# Patient Record
Sex: FEMALE | Race: ASIAN | Employment: FULL TIME | URBAN - METROPOLITAN AREA
[De-identification: names, ages, dates, MRNs, and addresses within clinical notes are randomized per-mention and may not be internally consistent; named-entity substitution may affect disease eponyms.]

---

## 2017-10-24 ENCOUNTER — APPOINTMENT (OUTPATIENT)
Dept: GENERAL RADIOLOGY | Age: 37
End: 2017-10-24
Attending: STUDENT IN AN ORGANIZED HEALTH CARE EDUCATION/TRAINING PROGRAM
Payer: MEDICAID

## 2017-10-24 ENCOUNTER — HOSPITAL ENCOUNTER (EMERGENCY)
Age: 37
Discharge: HOME | End: 2017-10-24
Attending: STUDENT IN AN ORGANIZED HEALTH CARE EDUCATION/TRAINING PROGRAM
Payer: MEDICAID

## 2017-10-24 VITALS
HEIGHT: 62 IN | BODY MASS INDEX: 34.78 KG/M2 | SYSTOLIC BLOOD PRESSURE: 152 MMHG | WEIGHT: 189 LBS | DIASTOLIC BLOOD PRESSURE: 96 MMHG | TEMPERATURE: 98.3 F | RESPIRATION RATE: 16 BRPM | HEART RATE: 102 BPM | OXYGEN SATURATION: 99 %

## 2017-10-24 DIAGNOSIS — J06.9 ACUTE UPPER RESPIRATORY INFECTION: Primary | ICD-10-CM

## 2017-10-24 PROCEDURE — 71020 XR CHEST PA LAT: CPT

## 2017-10-24 PROCEDURE — 99282 EMERGENCY DEPT VISIT SF MDM: CPT

## 2017-10-24 RX ORDER — BENZONATATE 100 MG/1
100 CAPSULE ORAL
Qty: 30 CAP | Refills: 0 | Status: SHIPPED | OUTPATIENT
Start: 2017-10-24 | End: 2017-10-31

## 2017-10-24 RX ORDER — GUAIFENESIN 600 MG/1
600 TABLET, EXTENDED RELEASE ORAL 2 TIMES DAILY
Qty: 10 TAB | Refills: 0 | Status: SHIPPED | OUTPATIENT
Start: 2017-10-24 | End: 2018-04-16 | Stop reason: ALTCHOICE

## 2017-10-25 NOTE — DISCHARGE INSTRUCTIONS

## 2017-10-25 NOTE — ED PROVIDER NOTES
HPI Comments: This is a 41 yo  female with complaint of sore throat, runny nose, nasal congestion and post-tussive emesis since yesterday. Pt daughter has same. No medications PTA. No associated fever, chills, headache, SOB, wheezing, abdominal pain, vomiting, diarrhea or urinary complaint. Patient is a 40 y.o. female presenting with cough. The history is provided by the patient. Cough   Associated symptoms include rhinorrhea and sore throat. Pertinent negatives include no chest pain, no chills, no ear pain, no headaches, no shortness of breath, no nausea, no vomiting and no confusion. History reviewed. No pertinent past medical history. History reviewed. No pertinent surgical history. History reviewed. No pertinent family history. Social History     Social History    Marital status:      Spouse name: N/A    Number of children: N/A    Years of education: N/A     Occupational History    Not on file. Social History Main Topics    Smoking status: Never Smoker    Smokeless tobacco: Never Used    Alcohol use No    Drug use: Not on file    Sexual activity: Not on file     Other Topics Concern    Not on file     Social History Narrative    No narrative on file         ALLERGIES: Review of patient's allergies indicates no known allergies. Review of Systems   Constitutional: Negative. Negative for chills, fatigue and fever. HENT: Positive for congestion, rhinorrhea, sneezing and sore throat. Negative for ear pain and facial swelling. Eyes: Negative for pain, discharge and itching. Respiratory: Positive for cough. Negative for chest tightness and shortness of breath. Cardiovascular: Negative. Negative for chest pain and leg swelling. Gastrointestinal: Negative. Negative for abdominal distention, abdominal pain, constipation, diarrhea, nausea and vomiting. Genitourinary: Negative for difficulty urinating, frequency and urgency.    Musculoskeletal: Negative for arthralgias, back pain, joint swelling, neck pain and neck stiffness. Skin: Negative for color change and rash. Neurological: Negative for dizziness, numbness and headaches. Psychiatric/Behavioral: Negative for confusion and decreased concentration. All other systems reviewed and are negative. Vitals:    10/24/17 1936   BP: (!) 152/96   Pulse: (!) 102   Resp: 16   Temp: 98.3 °F (36.8 °C)   SpO2: 99%   Weight: 85.7 kg (189 lb)   Height: 5' 2\" (1.575 m)            Physical Exam   Constitutional: She is oriented to person, place, and time. She appears well-developed and well-nourished. No distress. Well appearing  female in NAD   HENT:   Head: Normocephalic and atraumatic. Right Ear: Tympanic membrane, external ear and ear canal normal.   Left Ear: Tympanic membrane, external ear and ear canal normal.   Nose: Rhinorrhea present. Mouth/Throat: Uvula is midline, oropharynx is clear and moist and mucous membranes are normal. No oropharyngeal exudate. Eyes: Conjunctivae and EOM are normal. Pupils are equal, round, and reactive to light. Right eye exhibits no discharge. Left eye exhibits no discharge. No scleral icterus. Neck: Normal range of motion. Neck supple. Cardiovascular: Normal rate and regular rhythm. Exam reveals no gallop and no friction rub. No murmur heard. Pulmonary/Chest: Effort normal and breath sounds normal. She has no wheezes. She has no rales. Abdominal: Soft. Bowel sounds are normal. She exhibits no distension. There is no tenderness. There is no rebound and no guarding. Neurological: She is alert and oriented to person, place, and time. No cranial nerve deficit. Coordination normal.   Skin: Skin is warm and dry. She is not diaphoretic. Psychiatric: She has a normal mood and affect. Her behavior is normal.   Nursing note and vitals reviewed.        MDM  Number of Diagnoses or Management Options  Acute upper respiratory infection:   Diagnosis management comments: 39 yo  female with cough, nasal congestion, runny nose and sore throat x one day. Pt appears well hydrated and non-toxic without distress. Lungs CTA throughout without hypoxia or increased work of breathing. Daughter with URI. Likely the same. Deanna Mishra, [address]         Amount and/or Complexity of Data Reviewed  Tests in the radiology section of CPT®: ordered and reviewed  Independent visualization of images, tracings, or specimens: yes      ED Course       Procedures      Progress note    Imaging reviewed. Chest xray clear. Deanna Mishra, [address]    Patient's results have been reviewed with them. Patient and/or family have verbally conveyed their understanding and agreement of the patient's signs, symptoms, diagnosis, treatment and prognosis and additionally agree to follow up as recommended or return to the Emergency Room should their condition change prior to follow-up. Discharge instructions have also been provided to the patient with some educational information regarding their diagnosis as well a list of reasons why they would want to return to the ER prior to their follow-up appointment should their condition change. Deanna Mishra, [address]    A/P  URI: Saline nasal wash. Tesalon Perles for cough. Mucinex twice daily for congestion. Follow-up with regular doctor. Return for any new or worsening.  Deanna Ortiz, [address]

## 2018-04-16 ENCOUNTER — APPOINTMENT (OUTPATIENT)
Dept: GENERAL RADIOLOGY | Age: 38
End: 2018-04-16
Attending: EMERGENCY MEDICINE
Payer: COMMERCIAL

## 2018-04-16 ENCOUNTER — HOSPITAL ENCOUNTER (EMERGENCY)
Age: 38
Discharge: HOME | End: 2018-04-16
Attending: EMERGENCY MEDICINE
Payer: COMMERCIAL

## 2018-04-16 ENCOUNTER — OFFICE VISIT (OUTPATIENT)
Dept: PRIMARY CARE CLINIC | Age: 38
End: 2018-04-16

## 2018-04-16 VITALS
BODY MASS INDEX: 34.11 KG/M2 | DIASTOLIC BLOOD PRESSURE: 98 MMHG | RESPIRATION RATE: 16 BRPM | WEIGHT: 186.51 LBS | TEMPERATURE: 98.3 F | SYSTOLIC BLOOD PRESSURE: 129 MMHG | OXYGEN SATURATION: 99 % | HEART RATE: 92 BPM

## 2018-04-16 DIAGNOSIS — S93.402A SPRAIN OF LEFT ANKLE, UNSPECIFIED LIGAMENT, INITIAL ENCOUNTER: Primary | ICD-10-CM

## 2018-04-16 DIAGNOSIS — S99.919A ANKLE INJURY, UNSPECIFIED LATERALITY, INITIAL ENCOUNTER: Primary | ICD-10-CM
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
## 2018-04-16 PROCEDURE — 73610 X-RAY EXAM OF ANKLE: CPT

## 2018-04-16 PROCEDURE — L1930 AFO PLASTIC: HCPCS

## 2018-04-16 PROCEDURE — 99283 EMERGENCY DEPT VISIT LOW MDM: CPT

## 2018-04-16 PROCEDURE — 74011250637 HC RX REV CODE- 250/637: Performed by: PHYSICIAN ASSISTANT

## 2018-04-16 RX ORDER — NAPROXEN 500 MG/1
500 TABLET ORAL
Qty: 20 TAB | Refills: 0 | Status: SHIPPED | OUTPATIENT
Start: 2018-04-16

## 2018-04-16 RX ORDER — NAPROXEN 250 MG/1
500 TABLET ORAL ONCE
Status: COMPLETED | OUTPATIENT
Start: 2018-04-16 | End: 2018-04-16

## 2018-04-16 RX ADMIN — NAPROXEN 500 MG: 250 TABLET ORAL at 07:41

## 2018-04-16 NOTE — ED PROVIDER NOTES
EMERGENCY DEPARTMENT HISTORY AND PHYSICAL EXAM      Date: 4/16/2018  Patient Name: Rogelio Platt    History of Presenting Illness     Chief Complaint   Patient presents with    Ankle Injury     left ankle injury when twisted this monring here at work when fell down three steps. History Provided By: Patient    HPI: Rogelio Platt, 40 y.o. female presents ambulatory without assistance to the ED with cc of new onset constant left ankle pain, exacerbated with ambulation, after falling down 3 steps 1 hour ago. 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 She denies any previous injury to the affected area. Pt reports taking Motrin without relief. She denies taking any daily medications or having any allergies to medications. Pt denies any head trauma, LOC, back pain, numbness, tingling or other new pain/symtpoms at this time. PCP: Gustabo Gilbert MD    There are no other complaints, changes, or physical findings at this time. Current Outpatient Prescriptions   Medication Sig Dispense Refill    naproxen (NAPROSYN) 500 mg tablet Take 1 Tab by mouth every twelve (12) hours as needed for Pain. 20 Tab 0    guaiFENesin ER (MUCINEX) 600 mg ER tablet Take 1 Tab by mouth two (2) times a day. Indications: Cough 10 Tab 0       Past History     Past Medical History:  No past medical history on file. Past Surgical History:  No past surgical history on file. Family History:  No family history on file. Social History:  Social History   Substance Use Topics    Smoking status: Never Smoker    Smokeless tobacco: Never Used    Alcohol use No       Allergies:  No Known Allergies      Review of Systems   Review of Systems   Constitutional: Negative for chills and fever. HENT: Negative for congestion and sore throat. Respiratory: Negative for cough. Cardiovascular: Negative for chest pain. Gastrointestinal: Negative for abdominal pain. Musculoskeletal: Positive for arthralgias (left ankle).  Negative for back pain and neck pain. Neurological: Negative for syncope, numbness and headaches. All other systems reviewed and are negative. Physical Exam   Physical Exam   Constitutional: She is oriented to person, place, and time. She appears well-developed and well-nourished. No distress. 39 yo  appearing female   HENT:   Head: Normocephalic and atraumatic. Eyes: Conjunctivae are normal. Right eye exhibits no discharge. Left eye exhibits no discharge. Neck: Normal range of motion. Neck supple. Cardiovascular: Normal rate and intact distal pulses. Pulmonary/Chest: Effort normal.   Musculoskeletal:   L ANKLE: No swelling, ecchymosis or deformity. TTP to the medial ankle; otherwise NT to palpation of the remainder of the foot/ankle. FROM. No crepitus or laxity. Distal NV intact. Cap refill < 2 sec. Neurological: She is alert and oriented to person, place, and time. Skin: Skin is warm and dry. She is not diaphoretic. Psychiatric: She has a normal mood and affect. Her behavior is normal.   Nursing note and vitals reviewed. Diagnostic Study Results     Radiologic Studies -   XR ANKLE LT MIN 3 V   Final Result   EXAM:  XR ANKLE LT MIN 3 V     INDICATION:  Twisted left ankle this morning when she fell down 3 stairs.     COMPARISON: None.     FINDINGS: Three views of the left ankle demonstrate no fracture or disruption of  the ankle mortise. There is no other acute osseous or articular abnormality. The soft tissues are within normal limits.     IMPRESSION  IMPRESSION: No acute abnormality. Medical Decision Making   I am the first provider for this patient. I reviewed the vital signs, available nursing notes, past medical history, past surgical history, family history and social history. Vital Signs-Reviewed the patient's vital signs.   Patient Vitals for the past 12 hrs:   Temp Pulse Resp BP SpO2   04/16/18 0726 98.3 °F (36.8 °C) 92 16 (!) 129/98 99 %       Records Reviewed: Nursing Notes and Old Medical Records    Provider Notes (Medical Decision Making):   DDx: strain, sprain, fracture, contusion     ED Course:   7:40 AM  Initial assessment performed. The patients presenting problems have been discussed, and they are in agreement with the care plan formulated and outlined with them. I have encouraged them to ask questions as they arise throughout their visit. Instructed patient to follow up with employee wellness during initial evaluation and at time of discharge. Disposition:  DISCHARGE NOTE:  7:59 AM  The patient has been re-evaluated and is ready for discharge. Reviewed available results with patient. Counseled patient on diagnosis and care plan. Patient has expressed understanding, and all questions have been answered. Patient agrees with plan and agrees to follow up as recommended, or return to the ED if their symptoms worsen. Discharge instructions have been provided and explained to the patient, along with reasons to return to the ED. PLAN:  1. Current Discharge Medication List      START taking these medications    Details   naproxen (NAPROSYN) 500 mg tablet Take 1 Tab by mouth every twelve (12) hours as needed for Pain. Qty: 20 Tab, Refills: 0         CONTINUE these medications which have NOT CHANGED    Details   guaiFENesin ER (MUCINEX) 600 mg ER tablet Take 1 Tab by mouth two (2) times a day. Indications: Cough  Qty: 10 Tab, Refills: 0           2. Follow-up Information     Follow up With Details Comments [address] [place] [facility] [address], MD In 1 week As needed [address] [place]      Rut Mejia MD In 1 week As needed [address]  [address] [place] [address],[address]  [place] [address]  [phone]        3. Use crutches, ankle brace, and ACE wrap as discussed  4. Follow up with employee wellness  Return to ED if worse     Diagnosis     Clinical Impression:   1. Sprain of left ankle, unspecified ligament, initial encounter    2. Fall, initial encounter        Attestations:    ATTESTATION:  This note is prepared by Miriam Godoy, acting as Scribe for [facility] [address] : The scribe's documentation has been prepared under my direction and personally reviewed by me in its entirety.  I confirm that the note above accurately reflects all work, treatment, procedures, and medical decision making performed by me.        c

## 2018-04-16 NOTE — LETTER
[address] 
[facility] EMERGENCY DEPT 
[address] [place] [address] [address] [id number] [phone] Work/School Note Date: 4/16/2018 To Whom It May concern: 
 
Rogelio Hand was seen and treated today in the emergency room by the following provider(s): 
Physician Assistant: PHIL Smith. Please excuse Rogelio Hand from work today. Sincerely, Avi Smith

## 2018-04-16 NOTE — DISCHARGE INSTRUCTIONS
Ankle Sprain: Care Instructions  Your Care Instructions    An ankle sprain can happen when you twist your ankle. The ligaments that support the ankle can get stretched and torn. Often the ankle is swollen and painful. Ankle sprains may take from several weeks to several months to heal. Usually, the more pain and swelling you have, the more severe your ankle sprain is and the longer it will take to heal. You can heal faster and regain strength in your ankle with good home treatment. It is very important to give your ankle time to heal completely, so that you do not easily hurt your ankle again. Follow-up care is a key part of your treatment and safety. Be sure to make and go to all appointments, and call your doctor if you are having problems. It's also a good idea to know your test results and keep a list of the medicines you take. How can you care for yourself at home? · Prop up your foot on pillows as much as possible for the next 3 days. Try to keep your ankle above the level of your heart. This will help reduce the swelling. · Follow your doctor's directions for wearing a splint or elastic bandage. Wrapping the ankle may help reduce or prevent swelling. · Your doctor may give you a splint, a brace, an air stirrup, or another form of ankle support to protect your ankle until it is healed. Wear it as directed while your ankle is healing. Do not remove it unless your doctor tells you to. After your ankle has healed, ask your doctor whether you should wear the brace when you exercise. · Put ice or cold packs on your injured ankle for 10 to 20 minutes at a time. Try to do this every 1 to 2 hours for the next 3 days (when you are awake) or until the swelling goes down. Put a thin cloth between the ice and your skin. · You may need to use crutches until you can walk without pain. If you do use crutches, try to bear some weight on your injured ankle if you can do so without pain.  This helps the ankle heal.  · Take pain medicines exactly as directed. ¨ If the doctor gave you a prescription medicine for pain, take it as prescribed. ¨ If you are not taking a prescription pain medicine, ask your doctor if you can take an over-the-counter medicine. · If you have been given ankle exercises to do at home, do them exactly as instructed. These can promote healing and help prevent lasting weakness. When should you call for help? Call your doctor now or seek immediate medical care if:  ? · Your pain is getting worse. ? · Your swelling is getting worse. ? · Your splint feels too tight or you are unable to loosen it. ? Watch closely for changes in your health, and be sure to contact your doctor if:  ? · You are not getting better after 1 week. Where can you learn more? Go to http://celestine-aditya.info/. Enter F947 in the search box to learn more about \"Ankle Sprain: Care Instructions. \"  Current as of: March 21, 2017  Content Version: 11.4  © 2936-6743 Healthwise, Incorporated. Care instructions adapted under license by [employer] (which disclaims liability or warranty for this information). If you have questions about a medical condition or this instruction, always ask your healthcare professional. [address] any warranty or liability for your use of this information.

## 2018-04-16 NOTE — PROGRESS NOTES
Chief Complaint   Patient presents with    Ankle Injury   was evaluated treated in ED this morning for ankle injury, pt states she was told by ED to rtc for work note. This note will not be viewable in [address]

## 2018-04-16 NOTE — ED NOTES
Patient evaluated in [facility]. Patient alert and oriented;  C/o left ankle injury secondary to twisting ankle and secondarily fell down three steps. Pa to evaluate. Patient in xray at this time.

## 2018-04-17 ENCOUNTER — OFFICE VISIT (OUTPATIENT)
Dept: INTERNAL MEDICINE CLINIC | Age: 38
End: 2018-04-17

## 2018-04-17 VITALS
SYSTOLIC BLOOD PRESSURE: 120 MMHG | HEART RATE: 79 BPM | WEIGHT: 188.5 LBS | OXYGEN SATURATION: 98 % | BODY MASS INDEX: 34.69 KG/M2 | DIASTOLIC BLOOD PRESSURE: 74 MMHG | TEMPERATURE: 97.9 F | HEIGHT: 62 IN | RESPIRATION RATE: 16 BRPM

## 2018-04-17 DIAGNOSIS — S93.401A SPRAIN OF RIGHT ANKLE, UNSPECIFIED LIGAMENT, INITIAL ENCOUNTER: ICD-10-CM

## 2018-04-17 DIAGNOSIS — E66.9 CLASS 1 OBESITY WITHOUT SERIOUS COMORBIDITY WITH BODY MASS INDEX (BMI) OF 30.0 TO 30.9 IN ADULT, UNSPECIFIED OBESITY TYPE: ICD-10-CM

## 2018-04-17 DIAGNOSIS — Z00.00 ROUTINE GENERAL MEDICAL EXAMINATION AT A HEALTH CARE FACILITY: Primary | ICD-10-CM

## 2018-04-17 NOTE — PROGRESS NOTES
1. Have you been to the ER, urgent care clinic since your last visit? Hospitalized since your last visit? Yesterday [facility] ER for left ankle sprain- had accident at work. 2. Have you seen or consulted any other health care providers outside of the [address] [place] [address] since your last visit? Include any pap smears or colon screening. No     Chief Complaint   Patient presents with    New Patient     Not Fasting    Patient is overdue for Pap smear- has not seen GYN in a while.

## 2018-04-17 NOTE — LETTER
NOTIFICATION RETURN TO WORK / SCHOOL 
 
4/17/2018 10:05 AM 
 
Ms. Kartik Rodirguez 
[address] [place] [id number] To Whom It May Concern: 
 
Kartik Rodriguez is currently under the care of Jarrett Cortez. She will return to work/school on: April 24, 2081 If there are questions or concerns please have the patient contact our office. Sincerely, Hernán Cobb MD

## 2018-04-17 NOTE — MR AVS SNAPSHOT
[address] [facility], [address] [place] [address] 
[phone] Patient: Bill Nieves MRN: [id number] :1980 Visit Information Date & Time Provider Department Dept. Phone Encounter #  
 2018  9:45 AM Sebastián Lombardi MD [place] [address] [facility] [phone] Follow-up Instructions Return in about 6 days (around 2018) for Follow up 15 minutes. Upcoming Health Maintenance Date Due DTaP/Tdap/Td series (1 - Tdap) 10/13/2002 PAP AKA CERVICAL CYTOLOGY 10/13/2002 Allergies as of 2018  Review Complete On: 2018 By: Sebastián Lombardi MD  
 No Known Allergies Current Immunizations  Never Reviewed Name Date Influenza Vaccine 10/25/2017 Not reviewed this visit You Were Diagnosed With   
  
 Codes Comments Routine general medical examination at a health care facility    -  Primary ICD-10-CM: Z00.00 ICD-9-CM: V70.0 Vitals BP Pulse Temp Resp Height(growth percentile) Weight(growth percentile) 120/74 (BP 1 Location: Left arm, BP Patient Position: Sitting) 79 97.9 °F (36.6 °C) (Oral) 16 5' 2\" (1.575 m) 188 lb 8 oz (85.5 kg) LMP SpO2 BMI OB Status Smoking Status 2018 98% 34.48 kg/m2 Having regular periods Never Smoker Vitals History BMI and BSA Data Body Mass Index Body Surface Area 34.48 kg/m 2 1.93 m 2 Preferred Pharmacy Pharmacy Name Phone [id number]- [place] [phone] [facility] AT [address] [place] [address] [phone] Your Updated Medication List  
  
   
This list is accurate as of 18 10:10 AM.  Always use your most recent med list.  
  
  
  
  
 naproxen 500 mg tablet Commonly known as:  NAPROSYN Take 1 Tab by mouth every twelve (12) hours as needed for Pain. We Performed the Following CBC WITH AUTOMATED DIFF [05455 CPT(R)] LIPID PANEL [47466 CPT(R)] METABOLIC PANEL, COMPREHENSIVE [11630 CPT(R)] TSH REFLEX TO T4 [85997 CPT(R)] VITAMIN D, 25 HYDROXY [id number] CPT(R)] Follow-up Instructions Return in about 6 days (around 4/23/2018) for Follow up 15 minutes. Introducing \[facility]\"" & HEALTH SERVICES! Edilma Reoanh introduces [employer] patient portal. Now you can access parts of your medical record, email your doctor's office, and request medication refills online. 1. In your internet browser, go to [email or web address] 2. Click on the First Time User? Click Here link in the Sign In box. You will see the New Member Sign Up page. 3. Enter your [employer] Access Code exactly as it appears below. You will not need to use this code after youve completed the sign-up process. If you do not sign up before the expiration date, you must request a new code. · [employer] Access Code: [email or web address] Expires: 7/15/2018  7:20 AM 
 
4. Enter the last four digits of your Social Security Number (xxxx) and Date of Birth (mm/dd/yyyy) as indicated and click Submit. You will be taken to the next sign-up page. 5. Create a [employer] ID. This will be your [employer] login ID and cannot be changed, so think of one that is secure and easy to remember. 6. Create a [employer] password. You can change your password at any time. 7. Enter your Password Reset Question and Answer. This can be used at a later time if you forget your password. 8. Enter your e-mail address. You will receive e-mail notification when new information is available in [address] 9. Click Sign Up. You can now view and download portions of your medical record. 10. Click the Download Summary menu link to download a portable copy of your medical information. If you have questions, please visit the Frequently Asked Questions section of the [employer] website. Remember, [employer] is NOT to be used for urgent needs. For medical emergencies, dial 911. Now available from your iPhone and Android! Please provide this summary of care documentation to your next provider. Your primary care clinician is listed as Renée Noel. If you have any questions after today's visit, please call [phone].

## 2018-04-17 NOTE — PROGRESS NOTES
New Patient       HPI:  Joann Hernandez is a 40y.o. year old female who is here to establish care. She  had her medical care:    Dr. Yanelis Ratliff    She reports the following history and medical concerns:      twisted left ankle at work- going down stairs. Xray negative. Pain is 6/10    No other medical history    Had 2 children.     Gets biometric screening but it has been a while    Discussed her diet and lack of exercise. Overweight status can cause more injuries like this. Assessment and Plan        1. Routine general medical examination at a health care facility  Needs PAP smear- has been 6 years since last childbirth. - CBC WITH AUTOMATED DIFF  - LIPID PANEL  - TSH REFLEX TO T4  - METABOLIC PANEL, COMPREHENSIVE  - VITAMIN D, 25 HYDROXY    2. Sprain of right ankle, unspecified ligament, initial encounter  Keep elevated. Start naproxen started by ER but with food in stomach. Start moving toes and mild exercises with ankle. Reduce swelling is key    3. Class 1 obesity without serious comorbidity with body mass index (BMI) of 30.0 to 30.9 in adult, unspecified obesity type  Needs to start exercising after this to prevent DM runs in family          Visit Vitals    /74 (BP 1 Location: Left arm, BP Patient Position: Sitting)    Pulse 79    Temp 97.9 °F (36.6 °C) (Oral)    Resp 16    Ht 5' 2\" (1.575 m)    Wt 188 lb 8 oz (85.5 kg)    LMP 2018    SpO2 98%    BMI 34.48 kg/m2       Historical Data    Past Medical History:   Diagnosis Date    GERD (gastroesophageal reflux disease)        Past Surgical History:   Procedure Laterality Date    HX  SECTION  ,        Outpatient Encounter Prescriptions as of 2018   Medication Sig Dispense Refill    naproxen (NAPROSYN) 500 mg tablet Take 1 Tab by mouth every twelve (12) hours as needed for Pain.  (Patient not taking: Reported on 2018) 20 Tab 0     No facility-administered encounter medications on file as of 4/17/2018. No Known Allergies     Social History     Social History    Marital status:      Spouse name: N/A    Number of children: N/A    Years of education: N/A     Occupational History    Not on file. Social History Main Topics    Smoking status: Never Smoker    Smokeless tobacco: Never Used    Alcohol use No    Drug use: No    Sexual activity: Yes     Partners: Male     Birth control/ protection: None     Other Topics Concern    Not on file     Social History Narrative        family history includes Diabetes in her brother, father, and mother; Hypertension in her brother, father, and mother; No Known Problems in her sister; Stroke in her father. Review of Systems   Constitutional: Negative for weight loss. Eyes: Negative for blurred vision. Respiratory: Negative for shortness of breath. Cardiovascular: Negative for chest pain. Gastrointestinal: Negative for abdominal pain. Genitourinary: Negative for dysuria and frequency. Musculoskeletal: Positive for joint pain. Negative for myalgias and neck pain. Skin: Negative for rash. Neurological: Negative for dizziness, focal weakness, weakness and headaches. Endo/Heme/Allergies: Negative for environmental allergies. Does not bruise/bleed easily. Psychiatric/Behavioral: Negative for depression. Physical Exam   Constitutional: She is oriented to person, place, and time. She appears well-nourished. No distress. Neck: Carotid bruit is not present. No thyromegaly present. Cardiovascular: Normal rate, regular rhythm and normal heart sounds. Pulmonary/Chest: Effort normal and breath sounds normal. No respiratory distress. She has no wheezes. Abdominal: Soft. Bowel sounds are normal. She exhibits no mass. There is no tenderness. Musculoskeletal: She exhibits no edema or tenderness. Left foot: There is decreased range of motion (mild swelling.  good circulation toes.   patient foot wrapped and with brace.). There is no deformity. Feet:   Left Foot:   Skin Integrity: Negative for ulcer, blister, skin breakdown, erythema or warmth. Lymphadenopathy:     She has no cervical adenopathy. Neurological: She is alert and oriented to person, place, and time. Skin: Skin is warm and dry. No rash noted. No erythema. Psychiatric: She has a normal mood and affect. Thought content normal.   Nursing note and vitals reviewed. Ortho Exam       No orders of the defined types were placed in this encounter. I have reviewed the patient's medical history in detail and updated the computerized patient record. We had a prolonged discussion about these complex clinical issues and went over the various important aspects to consider. All questions were answered. Advised her to call back or return to office if symptoms do not improve, change in nature, or persist.    She was given an after visit summary or informed of [employer] [facility] which includes patient instructions, diagnoses, current medications, & vitals. She expressed understanding with the diagnosis and plan.

## 2018-04-21 LAB
25(OH)D3+25(OH)D2 SERPL-MCNC: 15.5 NG/ML (ref 30–100)
ALBUMIN SERPL-MCNC: 4.3 G/DL (ref 3.5–5.5)
ALBUMIN/GLOB SERPL: 1.4 {RATIO} (ref 1.2–2.2)
ALP SERPL-CCNC: 91 IU/L (ref 39–117)
ALT SERPL-CCNC: 19 IU/L (ref 0–32)
AST SERPL-CCNC: 18 IU/L (ref 0–40)
BASOPHILS # BLD AUTO: 0 X10E3/UL (ref 0–0.2)
BASOPHILS NFR BLD AUTO: 0 %
BILIRUB SERPL-MCNC: 0.4 MG/DL (ref 0–1.2)
BUN SERPL-MCNC: 9 MG/DL (ref 6–20)
BUN/CREAT SERPL: 17 (ref 9–23)
CALCIUM SERPL-MCNC: 9.1 MG/DL (ref 8.7–10.2)
CHLORIDE SERPL-SCNC: 100 MMOL/L (ref 96–106)
CHOLEST SERPL-MCNC: 194 MG/DL (ref 100–199)
CO2 SERPL-SCNC: 25 MMOL/L (ref 18–29)
CREAT SERPL-MCNC: 0.53 MG/DL (ref 0.57–1)
EOSINOPHIL # BLD AUTO: 0.4 X10E3/UL (ref 0–0.4)
EOSINOPHIL NFR BLD AUTO: 5 %
ERYTHROCYTE [DISTWIDTH] IN BLOOD BY AUTOMATED COUNT: 13.7 % (ref 12.3–15.4)
GFR SERPLBLD CREATININE-BSD FMLA CKD-EPI: 123 ML/MIN/1.73
GFR SERPLBLD CREATININE-BSD FMLA CKD-EPI: 141 ML/MIN/1.73
GLOBULIN SER CALC-MCNC: 3.1 G/DL (ref 1.5–4.5)
GLUCOSE SERPL-MCNC: 94 MG/DL (ref 65–99)
HCT VFR BLD AUTO: 39 % (ref 34–46.6)
HDLC SERPL-MCNC: 32 MG/DL
HGB BLD-MCNC: 12.6 G/DL (ref 11.1–15.9)
IMM GRANULOCYTES # BLD: 0 X10E3/UL (ref 0–0.1)
IMM GRANULOCYTES NFR BLD: 0 %
INTERPRETATION, 910389: NORMAL
LDLC SERPL CALC-MCNC: 113 MG/DL (ref 0–99)
LYMPHOCYTES # BLD AUTO: 2.4 X10E3/UL (ref 0.7–3.1)
LYMPHOCYTES NFR BLD AUTO: 32 %
MCH RBC QN AUTO: 26.8 PG (ref 26.6–33)
MCHC RBC AUTO-ENTMCNC: 32.3 G/DL (ref 31.5–35.7)
MCV RBC AUTO: 83 FL (ref 79–97)
MONOCYTES # BLD AUTO: 0.4 X10E3/UL (ref 0.1–0.9)
MONOCYTES NFR BLD AUTO: 5 %
NEUTROPHILS # BLD AUTO: 4.3 X10E3/UL (ref 1.4–7)
NEUTROPHILS NFR BLD AUTO: 58 %
PLATELET # BLD AUTO: 260 X10E3/UL (ref 150–379)
POTASSIUM SERPL-SCNC: 5 MMOL/L (ref 3.5–5.2)
PROT SERPL-MCNC: 7.4 G/DL (ref 6–8.5)
RBC # BLD AUTO: 4.71 X10E6/UL (ref 3.77–5.28)
SODIUM SERPL-SCNC: 139 MMOL/L (ref 134–144)
T4 SERPL-MCNC: 6.7 UG/DL (ref 4.5–12)
TRIGL SERPL-MCNC: 244 MG/DL (ref 0–149)
TSH SERPL DL<=0.005 MIU/L-ACNC: 5.67 UIU/ML (ref 0.45–4.5)
VLDLC SERPL CALC-MCNC: 49 MG/DL (ref 5–40)
WBC # BLD AUTO: 7.5 X10E3/UL (ref 3.4–10.8)

## 2018-04-23 ENCOUNTER — OFFICE VISIT (OUTPATIENT)
Dept: INTERNAL MEDICINE CLINIC | Age: 38
End: 2018-04-23

## 2018-04-23 VITALS
BODY MASS INDEX: 34.82 KG/M2 | SYSTOLIC BLOOD PRESSURE: 110 MMHG | DIASTOLIC BLOOD PRESSURE: 78 MMHG | WEIGHT: 189.2 LBS | HEART RATE: 94 BPM | OXYGEN SATURATION: 99 % | TEMPERATURE: 99.2 F | RESPIRATION RATE: 15 BRPM | HEIGHT: 62 IN

## 2018-04-23 DIAGNOSIS — E78.1 HIGH TRIGLYCERIDES: ICD-10-CM

## 2018-04-23 DIAGNOSIS — E55.9 VITAMIN D DEFICIENCY: ICD-10-CM

## 2018-04-23 DIAGNOSIS — E03.9 HYPOTHYROIDISM, UNSPECIFIED TYPE: Primary | ICD-10-CM

## 2018-04-23 DIAGNOSIS — S93.401A SPRAIN OF RIGHT ANKLE, UNSPECIFIED LIGAMENT, INITIAL ENCOUNTER: ICD-10-CM

## 2018-04-23 DIAGNOSIS — E66.3 OVERWEIGHT ON EXAMINATION: ICD-10-CM

## 2018-04-23 RX ORDER — ERGOCALCIFEROL 1.25 MG/1
50000 CAPSULE ORAL
Qty: 12 CAP | Refills: 1 | Status: SHIPPED | OUTPATIENT
Start: 2018-04-23

## 2018-04-23 RX ORDER — LEVOTHYROXINE SODIUM 50 UG/1
50 TABLET ORAL
Qty: 30 TAB | Refills: 2 | Status: SHIPPED | OUTPATIENT
Start: 2018-04-23

## 2018-04-23 NOTE — PATIENT INSTRUCTIONS
Hypothyroidism: Care Instructions  Your Care Instructions    You have hypothyroidism, which means that your body is not making enough thyroid hormone. This hormone helps your body use energy. If your thyroid level is low, you may feel tired, be constipated, have an increase in your blood pressure, or have dry skin or memory problems. You may also get cold easily, even when it is warm. Women with low thyroid levels may have heavy menstrual periods. A blood test to find your thyroid-stimulating hormone (TSH) level is used to check for hypothyroidism. A high TSH level may mean that you have low thyroid. When your body is not making enough thyroid hormone, TSH levels rise in an effort to make the body produce more. The treatment for hypothyroidism is to take thyroid hormone pills. You should start to feel better in 1 to 2 weeks. But it can take several months to see changes in the TSH level. You will need regular visits with your doctor to make sure you have the right dose of medicine. Most people need treatment for the rest of their lives. You will need to see your doctor regularly to have blood tests and to make sure you are doing well. Follow-up care is a key part of your treatment and safety. Be sure to make and go to all appointments, and call your doctor if you are having problems. It's also a good idea to know your test results and keep a list of the medicines you take. How can you care for yourself at home? · Take your thyroid hormone medicine exactly as prescribed. Call your doctor if you think you are having a problem with your medicine. Most people do not have side effects if they take the right amount of medicine regularly. ¨ Take the medicine 30 minutes before breakfast, and do not take it with calcium, vitamins, or iron. ¨ Do not take extra doses of your thyroid medicine. It will not help you get better any faster, and it may cause side effects.   ¨ If you forget to take a dose, do NOT take a double dose of medicine. Take your usual dose the next day. · Tell your doctor about all prescription, herbal, or over-the-counter products you take. · Take care of yourself. Eat a healthy diet, get enough sleep, and get regular exercise. When should you call for help? Call 911 anytime you think you may need emergency care. For example, call if:  ? · You passed out (lost consciousness). ? · You have severe trouble breathing. ? · You have a very slow heartbeat (less than 60 beats a minute). ? · You have a low body temperature (95°F or below). ?Call your doctor now or seek immediate medical care if:  ? · You feel tired, sluggish, or weak. ? · You have trouble remembering things or concentrating. ? · You do not begin to feel better 2 weeks after starting your medicine. ? Watch closely for changes in your health, and be sure to contact your doctor if you have any problems. Where can you learn more? Go to http://celestine-aditya.info/. Enter M471 in the search box to learn more about \"Hypothyroidism: Care Instructions. \"  Current as of: May 12, 2017  Content Version: 11.4  © 6906-4555 [employer]. Care instructions adapted under license by [employer] (which disclaims liability or warranty for this information). If you have questions about a medical condition or this instruction, always ask your healthcare professional. [address] any warranty or liability for your use of this information. Levothyroxine (By mouth)   Levothyroxine ([id number]-[id number]-[id number])  Treats hypothyroidism. Also treats an enlarged thyroid gland and thyroid cancer. Brand Name(s): Levoxyl, Synthroid, Tirosint, Unithroid   There may be other brand names for this medicine. When This Medicine Should Not Be Used: This medicine is not right for everyone. Do not use it if you had an allergic reaction to glycerol, or you recently had a heart attack.   How to Use This Medicine:   Capsule, Liquid, Tablet  · Take your medicine as directed. Your dose may need to be changed several times to find what works best for you. You may have to take this medicine for 6 to 8 weeks before your symptoms start to get better. · Read and follow the patient instructions that come with this medicine. Talk to your doctor or pharmacist if you have any questions. · Take this medicine in the morning on an empty stomach. Wait at least 30 to 60 minutes before you eat any food. · Capsule: Swallow whole. Do not cut or crush it. · Oral liquid:   ¨ This medicine may be mixed with water or be given directly into the mouth. ¨ If mixed with water: Squeeze the contents of 1 single unit-dose ampule into a glass or cup containing water. Stir and drink it immediately. Add some water to the glass or cup and drink the water. This will help get all of the medicine out of the glass or cup. Do not mix this medicine with any other liquid except water. Do not store the mixture for later use. ¨ If taken without water: Squeeze the medicine directly into the mouth or into a spoon and swallow it immediately. · Tablet: If this medicine is being given to a baby or a small child, you can crush the tablet and mix it in 1 to 2 teaspoons (5 to 10 milliliters) of water. This mixture can be given by spoon or dropper. Do not mix the tablet with any other liquid except water. Do not store the mixture. If you do not give the medicine right after it is mixed, throw it away. · Missed dose: Take a dose as soon as you remember. If it is almost time for your next dose, wait until then and take a regular dose. Do not take extra medicine to make up for a missed dose. · Store the medicine in a closed container at room temperature, away from heat, moisture, and direct light. Use the oral liquid within 15 days after opening the pouch. Keep the ampules in the pouch until you are ready to use them.   Drugs and Foods to Avoid:   Ask your doctor or pharmacist before using any other medicine, including over-the-counter medicines, vitamins, and herbal products. · Some foods and medicines can affect how levothyroxine works. Tell your doctor if you are using any of the following:  ¨ Amiodarone, dexamethasone, digoxin, imatinib, ketamine, phenobarbital, rifampin  ¨ Beta-blocker medicine  ¨ Blood thinner (including heparin, warfarin)  ¨ Insulin or diabetes medicine  ¨ Medicine to treat depression  · If you use antacids, medicine to treat high cholesterol (including cholestyramine, colesevelam, colestipol), orlistat, sevelamer, sucralfate, stomach medicine (including lansoprazole, omeprazole, pantoprazole), or any medicine that contains calcium or iron, take it at least 4 hours before or 4 hours after you take levothyroxine. · Cottonseed meal, dietary fiber, soybean flour (infant formula), or walnuts may decrease the absorption of this medicine. Talk with your doctor if you have questions. · Do not eat grapefruit or drink grapefruit juice while you are using this medicine. Warnings While Using This Medicine:   · Tell your doctor if you are pregnant or breastfeeding, or if you have anemia, blood clotting problems, diabetes, heart disease, osteoporosis, pituitary gland problems, or adrenal gland problems. Tell your doctor if you have recently received radiation therapy with iodine. Do not use this medicine to treat obesity or to lose weight. · Tell any doctor or dentist who treats you that you take this medicine. · Do not stop using this medicine suddenly. Your doctor will need to slowly decrease your dose before you stop it completely. · Your doctor will do lab tests at regular visits to check on the effects of this medicine. Keep all appointments. · Keep all medicine out of the reach of children. Never share your medicine with anyone.   Possible Side Effects While Using This Medicine:   Call your doctor right away if you notice any of these side effects:  · Allergic reaction: Itching or hives, swelling in your face or hands, swelling or tingling in your mouth or throat, chest tightness, trouble breathing  · Chest pain that may spread, trouble breathing, unusual sweating, fainting  · Fast, pounding, or uneven heartbeat  · Seizures or tremors  · Severe headache, blurred or double vision, nausea, vomiting (in children)  · Walking with a limp, knee or hip pain (in children)  If you notice these less serious side effects, talk with your doctor:   · Appetite or weight changes  · Changes in your menstrual periods  · Hair loss  · Nervousness, sensitivity to heat, sweating  If you notice other side effects that you think are caused by this medicine, tell your doctor. Call your doctor for medical advice about side effects. You may report side effects to FDA at 9-238-FDA-8603  © 2017 [address] [employer] Information is for End User's use only and may not be sold, redistributed or otherwise used for commercial purposes. The above information is an  only. It is not intended as medical advice for individual conditions or treatments. Talk to your doctor, nurse or pharmacist before following any medical regimen to see if it is safe and effective for you. Take thyroid medicine once a day empty stomach  Take vitamin D prescription once a week with food. Do not take every day. triglyericides are high and that can be early sign of sugar problems. Watch your sugar intake, bread, rice, noodles.

## 2018-04-23 NOTE — LETTER
NOTIFICATION RETURN TO WORK / SCHOOL 
 
4/23/2018 3:54 PM 
 
Ms. Loraine Britton 
[address] [place] [address] To Whom It May Concern: 
 
Loraine Britton is currently under the care of Jarrett Coretz. She will return to work/school on: 4/30/2018 If there are questions or concerns please have the patient contact our office. Sincerely, Kadie Camp MD

## 2018-04-23 NOTE — MR AVS SNAPSHOT
[address] [facility], [address] [facility] 57 
[phone] Patient: Vinod Rosado MRN: [id number] :1980 Visit Information Date & Time Provider Department Dept. Phone Encounter #  
 2018  3:30 PM [id number] Upcoming Health Maintenance Date Due DTaP/Tdap/Td series (1 - Tdap) 10/13/2002 PAP AKA CERVICAL CYTOLOGY 10/13/2002 Allergies as of 2018  Review Complete On: 2018 By: Marie Connors LPN No Known Allergies Current Immunizations  Never Reviewed Name Date Influenza Vaccine 10/25/2017 Not reviewed this visit You Were Diagnosed With   
  
 Codes Comments Hypothyroidism, unspecified type    -  Primary ICD-10-CM: E03.9 ICD-9-CM: 385. 9 Vitamin D deficiency     ICD-10-CM: E55.9 ICD-9-CM: 268.9 Vitals BP Pulse Temp Resp Height(growth percentile) Weight(growth percentile) 110/78 (BP 1 Location: Left arm, BP Patient Position: Sitting) 94 99.2 °F (37.3 °C) (Oral) 15 5' 2\" (1.575 m) 189 lb 3.2 oz (85.8 kg) LMP SpO2 BMI OB Status Smoking Status 2018 (Exact Date) 99% 34.61 kg/m2 Having regular periods Never Smoker Vitals History BMI and BSA Data Body Mass Index Body Surface Area  
 34.61 kg/m 2 1.94 m 2 Preferred Pharmacy Pharmacy Name Phone [facility]ACY #[id number]- [place] [id number] [facility] AT [address] [place] [address] [phone] Your Updated Medication List  
  
   
This list is accurate as of 18  4:05 PM.  Always use your most recent med list.  
  
  
  
  
 ergocalciferol 50,000 unit capsule Commonly known as:  ERGOCALCIFEROL Take 1 Cap by mouth every seven (7) days. levothyroxine 50 mcg tablet Commonly known as:  SYNTHROID Take 1 Tab by mouth Daily (before breakfast). Indications: hypothyroidism  
  
 naproxen 500 mg tablet Commonly known as:  NAPROSYN Take 1 Tab by mouth every twelve (12) hours as needed for Pain. Prescriptions Sent to Pharmacy Refills  
 levothyroxine (SYNTHROID) 50 mcg tablet 2 Sig: Take 1 Tab by mouth Daily (before breakfast). Indications: hypothyroidism Class: Normal  
 [facility] Raymond Pradhan 34  #: [phone] Route: Oral  
 ergocalciferol (ERGOCALCIFEROL) 50,000 unit capsule 1 Sig: Take 1 Cap by mouth every seven (7) days. Class: Normal  
 Pharmacy: [place], [address] [facility] #: [phone] Route: Oral  
  
Patient Instructions Hypothyroidism: Care Instructions Your Care Instructions You have hypothyroidism, which means that your body is not making enough thyroid hormone. This hormone helps your body use energy. If your thyroid level is low, you may feel tired, be constipated, have an increase in your blood pressure, or have dry skin or memory problems. You may also get cold easily, even when it is warm. Women with low thyroid levels may have heavy menstrual periods. A blood test to find your thyroid-stimulating hormone (TSH) level is used to check for hypothyroidism. A high TSH level may mean that you have low thyroid. When your body is not making enough thyroid hormone, TSH levels rise in an effort to make the body produce more. The treatment for hypothyroidism is to take thyroid hormone pills. You should start to feel better in 1 to 2 weeks. But it can take several months to see changes in the TSH level. You will need regular visits with your doctor to make sure you have the right dose of medicine. Most people need treatment for the rest of their lives. You will need to see your doctor regularly to have blood tests and to make sure you are doing well. Follow-up care is a key part of your treatment and safety.  Be sure to make and go to all appointments, and call your doctor if you are having problems. It's also a good idea to know your test results and keep a list of the medicines you take. How can you care for yourself at home? · Take your thyroid hormone medicine exactly as prescribed. Call your doctor if you think you are having a problem with your medicine. Most people do not have side effects if they take the right amount of medicine regularly. ¨ Take the medicine 30 minutes before breakfast, and do not take it with calcium, vitamins, or iron. ¨ Do not take extra doses of your thyroid medicine. It will not help you get better any faster, and it may cause side effects. ¨ If you forget to take a dose, do NOT take a double dose of medicine. Take your usual dose the next day. · Tell your doctor about all prescription, herbal, or over-the-counter products you take. · Take care of yourself. Eat a healthy diet, get enough sleep, and get regular exercise. When should you call for help? Call 911 anytime you think you may need emergency care. For example, call if: 
? · You passed out (lost consciousness). ? · You have severe trouble breathing. ? · You have a very slow heartbeat (less than 60 beats a minute). ? · You have a low body temperature (95°F or below). ?Call your doctor now or seek immediate medical care if: 
? · You feel tired, sluggish, or weak. ? · You have trouble remembering things or concentrating. ? · You do not begin to feel better 2 weeks after starting your medicine. ? Watch closely for changes in your health, and be sure to contact your doctor if you have any problems. Where can you learn more? Go to http://celestine-aditya.info/. Enter B473 in the search box to learn more about \"Hypothyroidism: Care Instructions. \" Current as of: May 12, 2017 Content Version: 11.4 © 3658-7676 Healthwise, Incorporated.  Care instructions adapted under license by [facility] [address] (which disclaims liability or warranty for this information). If you have questions about a medical condition or this instruction, always ask your healthcare professional. [address] any warranty or liability for your use of this information. Levothyroxine (By mouth) Levothyroxine ([id number]-KIN-een) Treats hypothyroidism. Also treats an enlarged thyroid gland and thyroid cancer. Brand Name(s): Levoxyl, Synthroid, Tirosint, Unithroid There may be other brand names for this medicine. When This Medicine Should Not Be Used: This medicine is not right for everyone. Do not use it if you had an allergic reaction to glycerol, or you recently had a heart attack. How to Use This Medicine:  
Capsule, Liquid, Tablet · Take your medicine as directed. Your dose may need to be changed several times to find what works best for you. You may have to take this medicine for 6 to 8 weeks before your symptoms start to get better. · Read and follow the patient instructions that come with this medicine. Talk to your doctor or pharmacist if you have any questions. · Take this medicine in the morning on an empty stomach. Wait at least 30 to 60 minutes before you eat any food. · Capsule: Swallow whole. Do not cut or crush it. · Oral liquid: ¨ This medicine may be mixed with water or be given directly into the mouth. ¨ If mixed with water: Squeeze the contents of 1 single unit-dose ampule into a glass or cup containing water. Stir and drink it immediately. Add some water to the glass or cup and drink the water. This will help get all of the medicine out of the glass or cup. Do not mix this medicine with any other liquid except water. Do not store the mixture for later use. ¨ If taken without water: Squeeze the medicine directly into the mouth or into a spoon and swallow it immediately.  
· Tablet: If this medicine is being given to a baby or a small child, you can crush the tablet and mix it in 1 to 2 teaspoons (5 to 10 milliliters) of water. This mixture can be given by spoon or dropper. Do not mix the tablet with any other liquid except water. Do not store the mixture. If you do not give the medicine right after it is mixed, throw it away. · Missed dose: Take a dose as soon as you remember. If it is almost time for your next dose, wait until then and take a regular dose. Do not take extra medicine to make up for a missed dose. · Store the medicine in a closed container at room temperature, away from heat, moisture, and direct light. Use the oral liquid within 15 days after opening the pouch. Keep the ampules in the pouch until you are ready to use them. Drugs and Foods to Avoid: Ask your doctor or pharmacist before using any other medicine, including over-the-counter medicines, vitamins, and herbal products. · Some foods and medicines can affect how levothyroxine works. Tell your doctor if you are using any of the following: ¨ Amiodarone, dexamethasone, digoxin, imatinib, ketamine, phenobarbital, rifampin ¨ Beta-blocker medicine ¨ Blood thinner (including heparin, warfarin) ¨ Insulin or diabetes medicine ¨ Medicine to treat depression · If you use antacids, medicine to treat high cholesterol (including cholestyramine, colesevelam, colestipol), orlistat, sevelamer, sucralfate, stomach medicine (including lansoprazole, omeprazole, pantoprazole), or any medicine that contains calcium or iron, take it at least 4 hours before or 4 hours after you take levothyroxine. · Cottonseed meal, dietary fiber, soybean flour (infant formula), or walnuts may decrease the absorption of this medicine. Talk with your doctor if you have questions. · Do not eat grapefruit or drink grapefruit juice while you are using this medicine. Warnings While Using This Medicine: · Tell your doctor if you are pregnant or breastfeeding, or if you have anemia, blood clotting problems, diabetes, heart disease, osteoporosis, pituitary gland problems, or adrenal gland problems. Tell your doctor if you have recently received radiation therapy with iodine. Do not use this medicine to treat obesity or to lose weight. · Tell any doctor or dentist who treats you that you take this medicine. · Do not stop using this medicine suddenly. Your doctor will need to slowly decrease your dose before you stop it completely. · Your doctor will do lab tests at regular visits to check on the effects of this medicine. Keep all appointments. · Keep all medicine out of the reach of children. Never share your medicine with anyone. Possible Side Effects While Using This Medicine:  
Call your doctor right away if you notice any of these side effects: · Allergic reaction: Itching or hives, swelling in your face or hands, swelling or tingling in your mouth or throat, chest tightness, trouble breathing · Chest pain that may spread, trouble breathing, unusual sweating, fainting · Fast, pounding, or uneven heartbeat · Seizures or tremors · Severe headache, blurred or double vision, nausea, vomiting (in children) · Walking with a limp, knee or hip pain (in children) If you notice these less serious side effects, talk with your doctor:  
· Appetite or weight changes · Changes in your menstrual periods · Hair loss · Nervousness, sensitivity to heat, sweating If you notice other side effects that you think are caused by this medicine, tell your doctor. Call your doctor for medical advice about side effects. You may report side effects to FDA at 1-810-FDA-1812 © 2017 [address] Information is for End User's use only and may not be sold, redistributed or otherwise used for commercial purposes. The above information is an  only. It is not intended as medical advice for individual conditions or treatments.  Talk to your doctor, nurse or pharmacist before following any medical regimen to see if it is safe and effective for you. Take thyroid medicine once a day empty stomach Take vitamin D prescription once a week with food. Do not take every day. triglyericides are high and that can be early sign of sugar problems. Watch your sugar intake, bread, rice, noodles. Introducing [facility]! [facility] introduces [employer]arshot patient portal. Now you can access parts of your medical record, email your doctor's office, and request medication refills online. 1. In your internet browser, go to [email or web address] 2. Click on the First Time User? Click Here link in the Sign In box. You will see the New Member Sign Up page. 3. Enter your Earshot Access Code exactly as it appears below. You will not need to use this code after youve completed the sign-up process. If you do not sign up before the expiration date, you must request a new code. · Earshot Access Code: [email or web address] Expires: 7/15/2018  7:20 AM 
 
4. Enter the last four digits of your Social Security Number (xxxx) and Date of Birth (mm/dd/yyyy) as indicated and click Submit. You will be taken to the next sign-up page. 5. Create a Earshot ID. This will be your Earshot login ID and cannot be changed, so think of one that is secure and easy to remember. 6. Create a Earshot password. You can change your password at any time. 7. Enter your Password Reset Question and Answer. This can be used at a later time if you forget your password. 8. Enter your e-mail address. You will receive e-mail notification when new information is available in [address] 9. Click Sign Up. You can now view and download portions of your medical record. 10. Click the Download Summary menu link to download a portable copy of your medical information.  
 
If you have questions, please visit the Frequently Asked Questions section of the e[employer]. Remember, [employer] is NOT to be used for urgent needs. For medical emergencies, dial 911. Now available from your iPhone and Android! Please provide this summary of care documentation to your next provider. Your primary care clinician is listed as Hernán Cobb. If you have any questions after today's visit, please call [phone].

## 2018-04-23 NOTE — PROGRESS NOTES
Chief Complaint   Patient presents with    Ankle Pain     1 week follow up     1. Have you been to the ER, urgent care clinic since your last visit? Hospitalized since your last visit? No    2. Have you seen or consulted any other health care providers outside of the [facility] since your last visit? Include any pap smears or colon screening.  No

## 2018-04-23 NOTE — PROGRESS NOTES
Ankle Pain (1 week follow up)       HPI:  Rolly Hammans is a 40y.o. year old female who is here for a follow up visit. She was last seen by me on 4/17/2018. She reports the following:    Feels better but can't walk long distances. Thinks she needs more time. A week. Swelling is down. Abnormal TSH  Problem with constipation, fatigue, weight gain. No cold intolerance. Some hair loss. Periods not heavy. Appetite is good. Some weak concentration. High TG- discussed poor diet. High risk for DM. Talked about childhood obesity for daughter and that she needs to make strong effort to reduce things like rice and processed foods in her house for her and daughter. Assessment and Plan        1. Hypothyroidism, unspecified type  The risks and benefits of the new medication were discussed as well as possible side effects. Patient is instructed to call if any new symptoms arise that are listed in the AVS printed or available on [employer] or discussed: Take on empty stomach. May help with weight. Watch for fast HR and dizziness  - levothyroxine (SYNTHROID) 50 mcg tablet; Take 1 Tab by mouth Daily (before breakfast). Indications: hypothyroidism  Dispense: 27 Tab; Refill: 2  - TSH 3RD GENERATION; Future    2. Vitamin D deficiency  Start vit D. Get more sun regularly but wear sunscreen on face  - ergocalciferol (ERGOCALCIFEROL) 50,000 unit capsule; Take 1 Cap by mouth every seven (7) days. Dispense: 12 Cap; Refill: 1    3. Overweight on examination  Discussion about TG and at risk for DM. Reducing carbs. Glycemic handout discussed and addressed again or given to patient in print out. Preventing Diabetes or improving a1c cannot be done just by foods, but regular exercise and weight loss- at least 150 minutes of exercise involving resistance training and cardio.   No Carbs at night is ideal.     4. Sprain of right ankle, unspecified ligament, initial encounter  Swelling down but still can't put weight on it. Off work for 1 more week. 5. High TG- discussed diet          Visit Vitals    /78 (BP 1 Location: Left arm, BP Patient Position: Sitting)    Pulse 94    Temp 99.2 °F (37.3 °C) (Oral)    Resp 15    Ht 5' 2\" (1.575 m)    Wt 189 lb 3.2 oz (85.8 kg)    LMP 2018 (Exact Date)    SpO2 99%    BMI 34.61 kg/m2       Historical Data    Past Medical History:   Diagnosis Date    Class 1 obesity without serious comorbidity with body mass index (BMI) of 30.0 to 30.9 in adult 2018    GERD (gastroesophageal reflux disease)     High triglycerides 2018    Hypothyroidism 2018    Vitamin D deficiency 2018       Past Surgical History:   Procedure Laterality Date    HX  SECTION  2012       Outpatient Encounter Prescriptions as of 2018   Medication Sig Dispense Refill    levothyroxine (SYNTHROID) 50 mcg tablet Take 1 Tab by mouth Daily (before breakfast). Indications: hypothyroidism 30 Tab 2    ergocalciferol (ERGOCALCIFEROL) 50,000 unit capsule Take 1 Cap by mouth every seven (7) days. 12 Cap 1    naproxen (NAPROSYN) 500 mg tablet Take 1 Tab by mouth every twelve (12) hours as needed for Pain. 20 Tab 0     No facility-administered encounter medications on file as of 2018. No Known Allergies     Social History     Social History    Marital status:      Spouse name: N/A    Number of children: N/A    Years of education: N/A     Occupational History    Housekeeping at hospital      Social History Main Topics    Smoking status: Never Smoker    Smokeless tobacco: Never Used    Alcohol use No    Drug use: No    Sexual activity: Yes     Partners: Male     Birth control/ protection: None     Other Topics Concern    Not on file     Social History Narrative        family history includes Diabetes in her brother, father, and mother; Hypertension in her brother, father, and mother; No Known Problems in her sister; Stroke in her father. Review of Systems   Constitutional: Negative for weight loss. Eyes: Negative for blurred vision. Respiratory: Negative for shortness of breath. Cardiovascular: Negative for chest pain. Gastrointestinal: Negative for abdominal pain. Genitourinary: Negative for dysuria and frequency. Skin: Negative for rash. Neurological: Negative for dizziness, focal weakness, weakness and headaches. Endo/Heme/Allergies: Negative for environmental allergies. Does not bruise/bleed easily. Physical Exam   Constitutional: She appears well-developed and well-nourished. She is active. Non-toxic appearance. She does not have a sickly appearance. She does not appear ill. No distress. Eyes: Conjunctivae are normal. Right eye exhibits no discharge. Cardiovascular: Normal rate, regular rhythm, S1 normal, S2 normal, normal heart sounds and normal pulses. Exam reveals no gallop and no friction rub. Pulmonary/Chest: Effort normal and breath sounds normal. No respiratory distress. Abdominal: Soft. Bowel sounds are normal.   Musculoskeletal: She exhibits no edema or deformity. Left foot: There is no deformity. Decreased range of motion: left foot wrapped. pt doing exercises. better ROM. good color in toes. Neurological: She is alert. Skin: Skin is warm and dry. No rash noted. No pallor. Psychiatric: She has a normal mood and affect. Her behavior is normal.   Vitals reviewed. Ortho Exam      Orders Placed This Encounter    TSH 3RD GENERATION     Standing Status:   Future     Standing Expiration Date:   10/20/2018    levothyroxine (SYNTHROID) 50 mcg tablet     Sig: Take 1 Tab by mouth Daily (before breakfast). Indications: hypothyroidism     Dispense:  30 Tab     Refill:  2    ergocalciferol (ERGOCALCIFEROL) 50,000 unit capsule     Sig: Take 1 Cap by mouth every seven (7) days.      Dispense:  12 Cap     Refill:  1        I have reviewed the patient's medical history in detail and updated the computerized patient record. We had a prolonged discussion about these complex clinical issues and went over the various important aspects to consider. All questions were answered. Advised her to call back or return to office if symptoms do not improve, change in nature, or persist.    She was given an after visit summary or informed of [employer] Access which includes patient instructions, diagnoses, current medications, & vitals. She expressed understanding with the diagnosis and plan.

## 2018-04-24 PROBLEM — E55.9 VITAMIN D DEFICIENCY: Status: ACTIVE | Noted: 2018-04-24

## 2018-04-24 PROBLEM — E03.9 HYPOTHYROIDISM: Status: ACTIVE | Noted: 2018-04-24

## 2018-04-24 PROBLEM — E78.1 HIGH TRIGLYCERIDES: Status: ACTIVE | Noted: 2018-04-24

## 2018-04-29 NOTE — PROGRESS NOTES
HISTORY OF PRESENT ILLNESS  Kuldip Kimble is a 40 y.o. female. HPI   This pt came right from the Ed with wrist bands still on to request return to work slip  She injured herself at work  She is also unsure of  ed instructions given to her    ROS   Not indicated    Physical Exam   Not indictaed-in slpint and with crutches    ASSESSMENT and PLAN    ICD-10-CM ICD-9-CM    1.  Ankle injury, unspecified laterality, initial encounter S99.919A 959.7    No need for another bill for this patient and copay  Instructed to get work note from ed and instructions

## 2018-04-30 ENCOUNTER — TELEPHONE (OUTPATIENT)
Dept: INTERNAL MEDICINE CLINIC | Age: 38
End: 2018-04-30

## 2018-04-30 NOTE — TELEPHONE ENCOUNTER
Pt and her supervisors Nate Burkett called back stating they need more information in regards to pt returning to work. Nate Burkett states that pt went to employee wellness and she is still hopping and she can not work like that.  Nate Burkett would like a call back to know if pt has been sent back to work with restrictions [phone]

## 2018-04-30 NOTE — TELEPHONE ENCOUNTER
I can give her another week off. Not sure her work will allow restrictions since she has to walk to do her work.       Let me know if she is ok with this

## 2018-05-01 ENCOUNTER — OFFICE VISIT (OUTPATIENT)
Dept: INTERNAL MEDICINE CLINIC | Age: 38
End: 2018-05-01

## 2018-05-01 VITALS
OXYGEN SATURATION: 97 % | SYSTOLIC BLOOD PRESSURE: 140 MMHG | DIASTOLIC BLOOD PRESSURE: 96 MMHG | HEIGHT: 62 IN | HEART RATE: 87 BPM | WEIGHT: 188.4 LBS | TEMPERATURE: 98 F | RESPIRATION RATE: 15 BRPM | BODY MASS INDEX: 34.67 KG/M2

## 2018-05-01 DIAGNOSIS — S93.401A SPRAIN OF RIGHT ANKLE, UNSPECIFIED LIGAMENT, INITIAL ENCOUNTER: Primary | ICD-10-CM

## 2018-05-01 DIAGNOSIS — E03.9 HYPOTHYROIDISM, UNSPECIFIED TYPE: ICD-10-CM

## 2018-05-01 NOTE — LETTER
NOTIFICATION RETURN TO WORK / SCHOOL 
 
5/1/2018 4:25 PM 
 
Ms. Phylicia Reynoso 
[address] [place] [id number] To Whom It May Concern: 
 
Phylicia Reynoso is currently under the care of Jarrett Cortez. She will return to work/school on: 5/7/2018 If there are questions or concerns please have the patient contact our office. Sincerely, Katie Ragland MD

## 2018-05-01 NOTE — PROGRESS NOTES
Chief Complaint   Patient presents with    Ankle Injury     2 week follow up     1. Have you been to the ER, urgent care clinic since your last visit? Hospitalized since your last visit? No    2. Have you seen or consulted any other health care providers outside of the [address] [place] [address] since your last visit? Include any pap smears or colon screening.  No

## 2018-05-01 NOTE — MR AVS SNAPSHOT
[facility], [facility] [address] [place] [address] 
[phone] Patient: Araseli Bruce MRN: [id number] :1980 Visit Information Date & Time Provider Department Dept. Phone Encounter #  
 2018  3:45 PM [facility] [id number] Upcoming Health Maintenance Date Due DTaP/Tdap/Td series (1 - Tdap) 10/13/2002 PAP AKA CERVICAL CYTOLOGY 10/13/2002 Influenza Age 5 to Adult 2018 Allergies as of 2018  Review Complete On: 2018 By: Allan Sheikh LPN No Known Allergies Current Immunizations  Never Reviewed Name Date Influenza Vaccine 10/25/2017 Not reviewed this visit Vitals BP Pulse Temp Resp Height(growth percentile) Weight(growth percentile) (!) 140/96 (BP 1 Location: Left arm, BP Patient Position: Sitting) 87 98 °F (36.7 °C) (Oral) 15 5' 2\" (1.575 m) 188 lb 6.4 oz (85.5 kg) LMP SpO2 BMI OB Status Smoking Status 2018 (Exact Date) 97% 34.46 kg/m2 Having regular periods Never Smoker BMI and BSA Data Body Mass Index Body Surface Area 34.46 kg/m 2 1.93 m 2 Preferred Pharmacy Pharmacy Name Phone [facility]/PHARMACY [id number] [id number] [facility] AT [address] [place] [address] [phone] Your Updated Medication List  
  
   
This list is accurate as of 18  4:25 PM.  Always use your most recent med list.  
  
  
  
  
 ergocalciferol 50,000 unit capsule Commonly known as:  ERGOCALCIFEROL Take 1 Cap by mouth every seven (7) days. levothyroxine 50 mcg tablet Commonly known as:  SYNTHROID Take 1 Tab by mouth Daily (before breakfast). Indications: hypothyroidism  
  
 naproxen 500 mg tablet Commonly known as:  NAPROSYN Take 1 Tab by mouth every twelve (12) hours as needed for Pain. Patient Instructions Nasacort AQ twice a day Instruction on how to use steam to improve congestion Put two tbs of baking soda in a pot (quart) of water and heat to steam.  Take off fire and place face over steam with towel over your head and pot. Allow congestion to come out of nasal passages and then come out of towel to blow your nose. Return into the steam tent. It also will help more effectively to put a few drops of peppermint oil or eucalyptus in the mixture. They key is to allow everything stuck in your sinuses and nose to come out so it is not a medium for infection. Introducing [facility]! [facility] introduces [employer]y patient portal. Now you can access parts of your medical record, email your doctor's office, and request medication refills online. 1. In your internet browser, go to [email or web address] 2. Click on the First Time User? Click Here link in the Sign In box. You will see the New Member Sign Up page. 3. Enter your Joosy Access Code exactly as it appears below. You will not need to use this code after youve completed the sign-up process. If you do not sign up before the expiration date, you must request a new code. · Joosy Access Code: [email or web address] Expires: 7/15/2018  7:20 AM 
 
4. Enter the last four digits of your Social Security Number (xxxx) and Date of Birth (mm/dd/yyyy) as indicated and click Submit. You will be taken to the next sign-up page. 5. Create a 5th Fingert ID. This will be your 5th Fingert login ID and cannot be changed, so think of one that is secure and easy to remember. 6. Create a 5th Fingert password. You can change your password at any time. 7. Enter your Password Reset Question and Answer. This can be used at a later time if you forget your password. 8. Enter your e-mail address. You will receive e-mail notification when new information is available in [address] 9. Click Sign Up. You can now view and download portions of your medical record. 10. Click the Download Summary menu link to download a portable copy of your medical information. If you have questions, please visit the Frequently Asked Questions section of the [employer] website. Remember, [employer] is NOT to be used for urgent needs. For medical emergencies, dial 911. Now available from your iPhone and Android! Please provide this summary of care documentation to your next provider. Your primary care clinician is listed as Vimal Lozoya. If you have any questions after today's visit, please call [phone].

## 2018-05-01 NOTE — PATIENT INSTRUCTIONS
Nasacort AQ twice a day    Instruction on how to use steam to improve congestion    Put two tbs of baking soda in a pot (quart) of water and heat to steam.  Take off fire and place face over steam with towel over your head and pot. Allow congestion to come out of nasal passages and then come out of towel to blow your nose. Return into the steam tent. It also will help more effectively to put a few drops of peppermint oil or eucalyptus in the mixture. They key is to allow everything stuck in your sinuses and nose to come out so it is not a medium for infection.

## 2018-05-02 NOTE — PROGRESS NOTES
Ankle Injury (2 week follow up)       HPI:  Fly De Oliveira is a 40y.o. year old female who is here for a follow up visit. She was last seen by me on 2018. She reports the following:    She tried to go back to work but had trouble walking. She feels she needs one more week. The swelling is gone. Reviewed lab work  She is taking vitamin D  She started her thyroid medicine. Assessment and Plan        1. Sprain of right ankle, unspecified ligament, initial encounter  Slow improvement. Is exercising it. Forms filled out to return next week. Walks with slight limp now. Weight has to go down as it put her at risk for more sprains and future orthopedic issues.  agrees. 2. Hypothyroidism, unspecified type  Continue present management. No changes made to regimen. Recheck TSH in 2-3 weeks after on medication. Visit Vitals    BP (!) 140/96 (BP 1 Location: Left arm, BP Patient Position: Sitting)    Pulse 87    Temp 98 °F (36.7 °C) (Oral)    Resp 15    Ht 5' 2\" (1.575 m)    Wt 188 lb 6.4 oz (85.5 kg)    LMP 2018 (Exact Date)    SpO2 97%    BMI 34.46 kg/m2       Historical Data    Past Medical History:   Diagnosis Date    Class 1 obesity without serious comorbidity with body mass index (BMI) of 30.0 to 30.9 in adult 2018    GERD (gastroesophageal reflux disease)     High triglycerides 2018    Hypothyroidism 2018    Vitamin D deficiency 2018       Past Surgical History:   Procedure Laterality Date    HX  SECTION  ,        Outpatient Encounter Prescriptions as of 2018   Medication Sig Dispense Refill    levothyroxine (SYNTHROID) 50 mcg tablet Take 1 Tab by mouth Daily (before breakfast). Indications: hypothyroidism 30 Tab 2    ergocalciferol (ERGOCALCIFEROL) 50,000 unit capsule Take 1 Cap by mouth every seven (7) days.  12 Cap 1    naproxen (NAPROSYN) 500 mg tablet Take 1 Tab by mouth every twelve (12) hours as needed for Pain. 20 Tab 0     No facility-administered encounter medications on file as of 5/1/2018. No Known Allergies     Social History     Social History    Marital status:      Spouse name: N/A    Number of children: N/A    Years of education: N/A     Occupational History    Housekeeping at hospital      Social History Main Topics    Smoking status: Never Smoker    Smokeless tobacco: Never Used    Alcohol use No    Drug use: No    Sexual activity: Yes     Partners: Male     Birth control/ protection: None     Other Topics Concern    Not on file     Social History Narrative        family history includes Diabetes in her brother, father, and mother; Hypertension in her brother, father, and mother; No Known Problems in her sister; Stroke in her father. Review of Systems   Constitutional: Negative for weight loss. Eyes: Negative for blurred vision. Respiratory: Negative for shortness of breath. Cardiovascular: Negative for chest pain. Gastrointestinal: Negative for abdominal pain. Genitourinary: Negative for dysuria and frequency. Skin: Negative for rash. Neurological: Negative for dizziness, focal weakness, weakness and headaches. Endo/Heme/Allergies: Negative for environmental allergies. Does not bruise/bleed easily. Physical Exam   Constitutional: She appears well-developed and well-nourished. She is active. Non-toxic appearance. She does not have a sickly appearance. She does not appear ill. No distress. Eyes: Conjunctivae are normal. Right eye exhibits no discharge. Cardiovascular: Normal rate, regular rhythm, S1 normal, S2 normal, normal heart sounds and normal pulses. Exam reveals no gallop and no friction rub. Pulmonary/Chest: Effort normal and breath sounds normal. No respiratory distress. Abdominal: Soft. Bowel sounds are normal.   Musculoskeletal: She exhibits no edema or deformity. Neurological: She is alert. Skin: Skin is warm and dry.  No rash noted. No pallor. Psychiatric: She has a normal mood and affect. Her behavior is normal.   Vitals reviewed. Left Ankle Exam   Swelling: None. Tenderness   None    Range of Motion   Dorsiflexion:    Normal  Plantar flexion: Abnormal  Inversion:         Abnormal  Eversion:            Comments:  Swelling improved. Some discomfort flexion on ankle. No point tenderness. Walks with limp but without brace or crutches. No orders of the defined types were placed in this encounter. I have reviewed the patient's medical history in detail and updated the computerized patient record. We had a prolonged discussion about these complex clinical issues and went over the various important aspects to consider. All questions were answered. Advised her to call back or return to office if symptoms do not improve, change in nature, or persist.    She was given an after visit summary or informed of [facility] Mobile Access which includes patient instructions, diagnoses, current medications, & vitals. She expressed understanding with the diagnosis and plan.

## 2018-05-07 ENCOUNTER — TELEPHONE (OUTPATIENT)
Dept: INTERNAL MEDICINE CLINIC | Age: 38
End: 2018-05-07

## 2018-05-07 NOTE — LETTER
NOTIFICATION OF RETURN TO WORK / SCHOOL 
 
5/7/2018 5:29 PM 
 
Ms. Bill Nieves 
[address] [place] [address] [facility] To Whom It May Concern: 
 
Bill Nieves is under the care of Jarrett Cortez. She will be able to return to work/school on 05/07/2018 with no restrictions. If there are questions or concerns please have the patient contact our office. Sincerely, Sebastián Lombardi MD

## 2018-05-07 NOTE — TELEPHONE ENCOUNTER
----- Message from [email or web address] sent at 5/7/2018  7:44 AM EDT -----  Regarding: Dr. Carissa Puente at [facility]) stated pt has a return to work note, but it needs to state full duty and no restrictions faxed to [phone]. Best contact number [phone].

## 2018-10-20 ENCOUNTER — HOSPITAL ENCOUNTER (EMERGENCY)
Age: 38
Discharge: HOME | End: 2018-10-20
Payer: COMMERCIAL

## 2018-10-20 ENCOUNTER — APPOINTMENT (OUTPATIENT)
Dept: GENERAL RADIOLOGY | Age: 38
End: 2018-10-20
Payer: COMMERCIAL

## 2018-10-20 VITALS
SYSTOLIC BLOOD PRESSURE: 127 MMHG | HEIGHT: 59 IN | HEART RATE: 90 BPM | DIASTOLIC BLOOD PRESSURE: 91 MMHG | OXYGEN SATURATION: 98 % | RESPIRATION RATE: 16 BRPM | TEMPERATURE: 98.1 F

## 2018-10-20 DIAGNOSIS — J06.9 ACUTE UPPER RESPIRATORY INFECTION: Primary | ICD-10-CM

## 2018-10-20 DIAGNOSIS — J02.9 ACUTE PHARYNGITIS, UNSPECIFIED ETIOLOGY: ICD-10-CM

## 2018-10-20 LAB — S PYO AG THROAT QL: NEGATIVE

## 2018-10-20 PROCEDURE — 87880 STREP A ASSAY W/OPTIC: CPT

## 2018-10-20 PROCEDURE — 99283 EMERGENCY DEPT VISIT LOW MDM: CPT

## 2018-10-20 PROCEDURE — 87081 CULTURE SCREEN ONLY: CPT

## 2018-10-20 PROCEDURE — 6370000000 HC RX 637 (ALT 250 FOR IP): Performed by: PHYSICIAN ASSISTANT

## 2018-10-20 PROCEDURE — 71046 X-RAY EXAM CHEST 2 VIEWS: CPT

## 2018-10-20 RX ORDER — IBUPROFEN 600 MG/1
600 TABLET ORAL ONCE
Status: COMPLETED | OUTPATIENT
Start: 2018-10-20 | End: 2018-10-20

## 2018-10-20 RX ORDER — IBUPROFEN 600 MG/1
600 TABLET ORAL EVERY 6 HOURS PRN
Qty: 30 TABLET | Refills: 0 | Status: SHIPPED | OUTPATIENT
Start: 2018-10-20 | End: 2018-12-11

## 2018-10-20 RX ORDER — ACETAMINOPHEN 500 MG
1000 TABLET ORAL ONCE
Status: COMPLETED | OUTPATIENT
Start: 2018-10-20 | End: 2018-10-20

## 2018-10-20 RX ADMIN — ACETAMINOPHEN 1000 MG: 500 TABLET ORAL at 03:01

## 2018-10-20 RX ADMIN — IBUPROFEN 600 MG: 600 TABLET ORAL at 03:01

## 2018-10-20 ASSESSMENT — ENCOUNTER SYMPTOMS
ABDOMINAL PAIN: 0
SHORTNESS OF BREATH: 0
BLOOD IN STOOL: 0
COUGH: 1
DIARRHEA: 0
SORE THROAT: 1
TROUBLE SWALLOWING: 0
VOMITING: 0
BACK PAIN: 0
NAUSEA: 0

## 2018-10-20 ASSESSMENT — PAIN SCALES - GENERAL
PAINLEVEL_OUTOF10: 8
PAINLEVEL_OUTOF10: 8

## 2018-10-20 NOTE — ED NOTES
Pt discharged in stable condition, VSS, no signs of distress. Discharge instructions and meds reviewed. Pt verbalizes understanding and states no further questions or concerns unaddressed.        Bryanna Benavides RN  10/20/18 [id number]

## 2018-10-22 LAB — S PYO THROAT QL CULT: NORMAL

## 2018-12-11 ENCOUNTER — HOSPITAL ENCOUNTER (EMERGENCY)
Age: 38
Discharge: HOME | End: 2018-12-11
Payer: COMMERCIAL

## 2018-12-11 ENCOUNTER — APPOINTMENT (OUTPATIENT)
Dept: ULTRASOUND IMAGING | Age: 38
End: 2018-12-11
Payer: COMMERCIAL

## 2018-12-11 ENCOUNTER — TELEPHONE (OUTPATIENT)
Dept: OBGYN | Age: 38
End: 2018-12-11

## 2018-12-11 VITALS
DIASTOLIC BLOOD PRESSURE: 64 MMHG | HEART RATE: 63 BPM | WEIGHT: 190 LBS | OXYGEN SATURATION: 98 % | HEIGHT: 59 IN | TEMPERATURE: 97 F | SYSTOLIC BLOOD PRESSURE: 110 MMHG | RESPIRATION RATE: 12 BRPM | BODY MASS INDEX: 38.3 KG/M2

## 2018-12-11 DIAGNOSIS — O20.9 VAGINAL BLEEDING BEFORE 22 WEEKS GESTATION: Primary | ICD-10-CM

## 2018-12-11 DIAGNOSIS — O20.0 THREATENED MISCARRIAGE: ICD-10-CM

## 2018-12-11 LAB
A/G RATIO: 1.1 (ref 1.1–2.2)
ALBUMIN SERPL-MCNC: 3.9 G/DL (ref 3.4–5)
ALP BLD-CCNC: 80 U/L (ref 40–129)
ALT SERPL-CCNC: 22 U/L (ref 10–40)
ANION GAP SERPL CALCULATED.3IONS-SCNC: 14 MMOL/L (ref 3–16)
AST SERPL-CCNC: 23 U/L (ref 15–37)
BACTERIA WET PREP: ABNORMAL
BACTERIA: ABNORMAL /HPF
BASOPHILS ABSOLUTE: 0 K/UL (ref 0–0.2)
BASOPHILS RELATIVE PERCENT: 0.6 %
BILIRUB SERPL-MCNC: 0.5 MG/DL (ref 0–1)
BILIRUBIN URINE: ABNORMAL
BLOOD, URINE: ABNORMAL
BUN BLDV-MCNC: 6 MG/DL (ref 7–20)
CALCIUM SERPL-MCNC: 9.3 MG/DL (ref 8.3–10.6)
CHLORIDE BLD-SCNC: 99 MMOL/L (ref 99–110)
CLARITY: ABNORMAL
CLUE CELLS: ABNORMAL
CO2: 22 MMOL/L (ref 21–32)
COLOR: ABNORMAL
COMMENT UA: ABNORMAL
CREAT SERPL-MCNC: <0.5 MG/DL (ref 0.6–1.1)
EOSINOPHILS ABSOLUTE: 0.1 K/UL (ref 0–0.6)
EOSINOPHILS RELATIVE PERCENT: 1.6 %
EPITHELIAL CELLS WET PREP: ABNORMAL
EPITHELIAL CELLS, UA: 35 /HPF (ref 0–5)
GFR AFRICAN AMERICAN: >60
GFR NON-AFRICAN AMERICAN: >60
GLOBULIN: 3.6 G/DL
GLUCOSE BLD-MCNC: 137 MG/DL (ref 70–99)
GLUCOSE URINE: NEGATIVE MG/DL
GONADOTROPIN, CHORIONIC (HCG) QUANT: NORMAL MIU/ML
HCG(URINE) PREGNANCY TEST: POSITIVE
HCT VFR BLD CALC: 36.9 % (ref 36–48)
HEMOGLOBIN: 12.2 G/DL (ref 12–16)
KETONES, URINE: 40 MG/DL
LEUKOCYTE ESTERASE, URINE: ABNORMAL
LYMPHOCYTES ABSOLUTE: 1.5 K/UL (ref 1–5.1)
LYMPHOCYTES RELATIVE PERCENT: 20.5 %
MCH RBC QN AUTO: 27.7 PG (ref 26–34)
MCHC RBC AUTO-ENTMCNC: 33.1 G/DL (ref 31–36)
MCV RBC AUTO: 83.6 FL (ref 80–100)
MICROSCOPIC EXAMINATION: YES
MONOCYTES ABSOLUTE: 0.4 K/UL (ref 0–1.3)
MONOCYTES RELATIVE PERCENT: 5.8 %
MUCUS: ABNORMAL /LPF
NEUTROPHILS ABSOLUTE: 5.4 K/UL (ref 1.7–7.7)
NEUTROPHILS RELATIVE PERCENT: 71.5 %
NITRITE, URINE: NEGATIVE
PDW BLD-RTO: 13.7 % (ref 12.4–15.4)
PH UA: 6
PLATELET # BLD: 193 K/UL (ref 135–450)
PMV BLD AUTO: 9.4 FL (ref 5–10.5)
POTASSIUM REFLEX MAGNESIUM: 3.6 MMOL/L (ref 3.5–5.1)
PROTEIN UA: 30 MG/DL
RBC # BLD: 4.42 M/UL (ref 4–5.2)
RBC UA: ABNORMAL /HPF (ref 0–2)
RBC WET PREP: ABNORMAL
SODIUM BLD-SCNC: 135 MMOL/L (ref 136–145)
SOURCE WET PREP: ABNORMAL
SPECIFIC GRAVITY UA: 1.02
TOTAL PROTEIN: 7.5 G/DL (ref 6.4–8.2)
TRICHOMONAS PREP: ABNORMAL
URINE REFLEX TO CULTURE: YES
URINE TYPE: ABNORMAL
UROBILINOGEN, URINE: 1 E.U./DL
WBC # BLD: 7.6 K/UL (ref 4–11)
WBC UA: 3 /HPF (ref 0–5)
WBC WET PREP: ABNORMAL
YEAST WET PREP: ABNORMAL

## 2018-12-11 PROCEDURE — 85025 COMPLETE CBC W/AUTO DIFF WBC: CPT

## 2018-12-11 PROCEDURE — 87591 N.GONORRHOEAE DNA AMP PROB: CPT

## 2018-12-11 PROCEDURE — 84702 CHORIONIC GONADOTROPIN TEST: CPT

## 2018-12-11 PROCEDURE — 84703 CHORIONIC GONADOTROPIN ASSAY: CPT

## 2018-12-11 PROCEDURE — 87210 SMEAR WET MOUNT SALINE/INK: CPT

## 2018-12-11 PROCEDURE — 81001 URINALYSIS AUTO W/SCOPE: CPT

## 2018-12-11 PROCEDURE — 87491 CHLMYD TRACH DNA AMP PROBE: CPT

## 2018-12-11 PROCEDURE — 99284 EMERGENCY DEPT VISIT MOD MDM: CPT

## 2018-12-11 PROCEDURE — 87086 URINE CULTURE/COLONY COUNT: CPT

## 2018-12-11 PROCEDURE — 80053 COMPREHEN METABOLIC PANEL: CPT

## 2018-12-11 PROCEDURE — 76817 TRANSVAGINAL US OBSTETRIC: CPT

## 2018-12-11 RX ORDER — ONDANSETRON 4 MG/1
4-8 TABLET, ORALLY DISINTEGRATING ORAL EVERY 12 HOURS PRN
Qty: 12 TABLET | Refills: 0 | Status: SHIPPED | OUTPATIENT
Start: 2018-12-11

## 2018-12-11 RX ORDER — CEFUROXIME AXETIL 500 MG/1
500 TABLET ORAL 2 TIMES DAILY
Qty: 20 TABLET | Refills: 0 | Status: SHIPPED | OUTPATIENT
Start: 2018-12-11 | End: 2018-12-13 | Stop reason: ALTCHOICE

## 2018-12-11 RX ORDER — ONDANSETRON 4 MG/1
8 TABLET, ORALLY DISINTEGRATING ORAL ONCE
Status: DISCONTINUED | OUTPATIENT
Start: 2018-12-11 | End: 2018-12-11 | Stop reason: HOSPADM

## 2018-12-11 NOTE — LETTER
[facility] Emergency Department  [address] [facility] [address]  Phone: [phone]             December 11, 2018    Patient: Jignesh Chaves   YOB: 1980   Date of Visit: 12/11/2018       To Whom It May Concern:    Jginesh Chaves was seen and treated in our emergency department on 12/11/2018. She may return to work on 12/13/018.       Sincerely,               Alondra ALANIZ        Signature:__________________________________

## 2018-12-12 LAB
C TRACH DNA GENITAL QL NAA+PROBE: NEGATIVE
N. GONORRHOEAE DNA: NEGATIVE
URINE CULTURE, ROUTINE: NORMAL

## 2018-12-13 ENCOUNTER — HOSPITAL ENCOUNTER (OUTPATIENT)
Age: 38
Discharge: HOME | End: 2018-12-13
Payer: COMMERCIAL

## 2018-12-13 ENCOUNTER — APPOINTMENT (OUTPATIENT)
Dept: OBGYN | Age: 38
End: 2018-12-13
Payer: COMMERCIAL

## 2018-12-13 ENCOUNTER — INITIAL PRENATAL (OUTPATIENT)
Dept: OBGYN | Age: 38
End: 2018-12-13
Payer: COMMERCIAL

## 2018-12-13 DIAGNOSIS — O09.521 ELDERLY MULTIGRAVIDA IN FIRST TRIMESTER: ICD-10-CM

## 2018-12-13 DIAGNOSIS — Z98.891 HISTORY OF C-SECTION: ICD-10-CM

## 2018-12-13 DIAGNOSIS — Z34.91 PRENATAL CARE IN FIRST TRIMESTER: Primary | ICD-10-CM

## 2018-12-13 DIAGNOSIS — Z86.32 HISTORY OF INSULIN CONTROLLED GESTATIONAL DIABETES MELLITUS: ICD-10-CM

## 2018-12-13 DIAGNOSIS — Z34.81 PRENATAL CARE, SUBSEQUENT PREGNANCY, FIRST TRIMESTER: ICD-10-CM

## 2018-12-13 LAB
ABO/RH: NORMAL
ANTIBODY SCREEN: NORMAL
HEPATITIS C ANTIBODY INTERPRETATION: NORMAL

## 2018-12-13 PROCEDURE — 87390 HIV-1 AG IA: CPT

## 2018-12-13 PROCEDURE — 86900 BLOOD TYPING SEROLOGIC ABO: CPT

## 2018-12-13 PROCEDURE — 86762 RUBELLA ANTIBODY: CPT

## 2018-12-13 PROCEDURE — 86901 BLOOD TYPING SEROLOGIC RH(D): CPT

## 2018-12-13 PROCEDURE — 99204 OFFICE O/P NEW MOD 45 MIN: CPT | Performed by: OBSTETRICS & GYNECOLOGY

## 2018-12-13 PROCEDURE — G0463 HOSPITAL OUTPT CLINIC VISIT: HCPCS | Performed by: OBSTETRICS & GYNECOLOGY

## 2018-12-13 PROCEDURE — 87340 HEPATITIS B SURFACE AG IA: CPT

## 2018-12-13 PROCEDURE — 86701 HIV-1ANTIBODY: CPT

## 2018-12-13 PROCEDURE — 36415 COLL VENOUS BLD VENIPUNCTURE: CPT

## 2018-12-13 PROCEDURE — 86850 RBC ANTIBODY SCREEN: CPT

## 2018-12-13 PROCEDURE — 86702 HIV-2 ANTIBODY: CPT

## 2018-12-13 PROCEDURE — 86780 TREPONEMA PALLIDUM: CPT

## 2018-12-13 PROCEDURE — 85025 COMPLETE CBC W/AUTO DIFF WBC: CPT

## 2018-12-13 PROCEDURE — 86803 HEPATITIS C AB TEST: CPT

## 2018-12-13 PROCEDURE — 87086 URINE CULTURE/COLONY COUNT: CPT

## 2018-12-14 LAB
BASOPHILS ABSOLUTE: 0 K/UL (ref 0–0.2)
BASOPHILS RELATIVE PERCENT: 0.4 %
EOSINOPHILS ABSOLUTE: 0.1 K/UL (ref 0–0.6)
EOSINOPHILS RELATIVE PERCENT: 1.7 %
HCT VFR BLD CALC: 38.2 % (ref 36–48)
HEMOGLOBIN: 12.5 G/DL (ref 12–16)
HEPATITIS B SURFACE ANTIGEN INTERPRETATION: NORMAL
HIV AG/AB: NORMAL
HIV ANTIGEN: NORMAL
HIV-1 ANTIBODY: NORMAL
HIV-2 AB: NORMAL
LYMPHOCYTES ABSOLUTE: 1.9 K/UL (ref 1–5.1)
LYMPHOCYTES RELATIVE PERCENT: 24.8 %
MCH RBC QN AUTO: 27.7 PG (ref 26–34)
MCHC RBC AUTO-ENTMCNC: 32.6 G/DL (ref 31–36)
MCV RBC AUTO: 85.1 FL (ref 80–100)
MONOCYTES ABSOLUTE: 0.4 K/UL (ref 0–1.3)
MONOCYTES RELATIVE PERCENT: 5.7 %
NEUTROPHILS ABSOLUTE: 5.2 K/UL (ref 1.7–7.7)
NEUTROPHILS RELATIVE PERCENT: 67.4 %
PDW BLD-RTO: 14 % (ref 12.4–15.4)
PLATELET # BLD: 207 K/UL (ref 135–450)
PMV BLD AUTO: 10.2 FL (ref 5–10.5)
RBC # BLD: 4.49 M/UL (ref 4–5.2)
RUBELLA ANTIBODY IGG: 4.7 IU/ML
TOTAL SYPHILLIS IGG/IGM: NORMAL
URINE CULTURE, ROUTINE: NORMAL
WBC # BLD: 7.7 K/UL (ref 4–11)

## 2019-01-04 ENCOUNTER — TELEPHONE (OUTPATIENT)
Dept: OBGYN | Age: 39
End: 2019-01-04

## 2019-01-16 PROBLEM — O09.899 RUBELLA NON-IMMUNE STATUS, ANTEPARTUM: Status: ACTIVE | Noted: 2019-01-16

## 2019-01-16 PROBLEM — Z28.39 RUBELLA NON-IMMUNE STATUS, ANTEPARTUM: Status: ACTIVE | Noted: 2019-01-16

## 2019-01-17 ENCOUNTER — ROUTINE PRENATAL (OUTPATIENT)
Dept: OBGYN | Age: 39
End: 2019-01-17
Payer: COMMERCIAL

## 2019-01-17 VITALS
DIASTOLIC BLOOD PRESSURE: 72 MMHG | HEART RATE: 90 BPM | SYSTOLIC BLOOD PRESSURE: 126 MMHG | BODY MASS INDEX: 37.57 KG/M2 | WEIGHT: 186 LBS

## 2019-01-17 DIAGNOSIS — O09.299 HISTORY OF GESTATIONAL DIABETES IN PRIOR PREGNANCY, CURRENTLY PREGNANT: ICD-10-CM

## 2019-01-17 DIAGNOSIS — O09.899 RUBELLA NON-IMMUNE STATUS, ANTEPARTUM: ICD-10-CM

## 2019-01-17 DIAGNOSIS — Z28.39 RUBELLA NON-IMMUNE STATUS, ANTEPARTUM: ICD-10-CM

## 2019-01-17 DIAGNOSIS — Z34.92 PRENATAL CARE IN SECOND TRIMESTER: Primary | ICD-10-CM

## 2019-01-17 DIAGNOSIS — Z86.32 HISTORY OF GESTATIONAL DIABETES IN PRIOR PREGNANCY, CURRENTLY PREGNANT: ICD-10-CM

## 2019-01-17 LAB
BILIRUBIN URINE: NEGATIVE MG/DL
BLOOD, URINE: NEGATIVE
CLARITY: CLEAR
COLOR: YELLOW
GLUCOSE URINE: NEGATIVE MG/DL
KETONES, URINE: ABNORMAL MG/DL
LEUKOCYTE ESTERASE, URINE: NEGATIVE
NITRITE, URINE: NEGATIVE
PH UA: 6
PROTEIN UA: 30 MG/DL
SPECIFIC GRAVITY UA: >=1.03
UROBILINOGEN, URINE: 1 E.U./DL

## 2019-01-17 PROCEDURE — 81003 URINALYSIS AUTO W/O SCOPE: CPT

## 2019-01-17 PROCEDURE — 99212 OFFICE O/P EST SF 10 MIN: CPT | Performed by: OBSTETRICS & GYNECOLOGY

## 2019-01-17 RX ORDER — BLOOD-GLUCOSE METER
1 KIT MISCELLANEOUS DAILY
Qty: 1 KIT | Refills: 0 | Status: SHIPPED | OUTPATIENT
Start: 2019-01-17

## 2019-01-17 RX ORDER — LANCETS 28 GAUGE
1 EACH MISCELLANEOUS 4 TIMES DAILY
Qty: 100 EACH | Refills: 5 | Status: SHIPPED | OUTPATIENT
Start: 2019-01-17

## 2019-01-17 RX ORDER — GLUCOSAMINE HCL/CHONDROITIN SU 500-400 MG
CAPSULE ORAL
Qty: 100 STRIP | Refills: 5 | Status: SHIPPED | OUTPATIENT
Start: 2019-01-17

## 2019-01-22 ENCOUNTER — TELEPHONE (OUTPATIENT)
Dept: GYNECOLOGY | Age: 39
End: 2019-01-22

## 2019-01-29 ENCOUNTER — HOSPITAL ENCOUNTER (OUTPATIENT)
Age: 39
Discharge: HOME | End: 2019-01-29
Payer: COMMERCIAL

## 2019-01-29 ENCOUNTER — TELEPHONE (OUTPATIENT)
Dept: GYNECOLOGY | Age: 39
End: 2019-01-29

## 2019-01-29 ENCOUNTER — HOSPITAL ENCOUNTER (OUTPATIENT)
Dept: DIABETES SERVICES | Age: 39
Setting detail: THERAPIES SERIES
Discharge: HOME | End: 2019-01-29
Payer: COMMERCIAL

## 2019-01-29 DIAGNOSIS — O24.419 GESTATIONAL DIABETES MELLITUS (GDM), ANTEPARTUM, GESTATIONAL DIABETES METHOD OF CONTROL UNSPECIFIED: Primary | ICD-10-CM

## 2019-01-29 DIAGNOSIS — Z34.92 PRENATAL CARE IN SECOND TRIMESTER: ICD-10-CM

## 2019-01-29 DIAGNOSIS — Z86.32 HISTORY OF GESTATIONAL DIABETES IN PRIOR PREGNANCY, CURRENTLY PREGNANT: ICD-10-CM

## 2019-01-29 DIAGNOSIS — O09.299 HISTORY OF GESTATIONAL DIABETES IN PRIOR PREGNANCY, CURRENTLY PREGNANT: ICD-10-CM

## 2019-01-29 PROCEDURE — G0109 DIAB MANAGE TRN IND/GROUP: HCPCS | Performed by: DIETITIAN, REGISTERED

## 2019-01-29 PROCEDURE — 36415 COLL VENOUS BLD VENIPUNCTURE: CPT

## 2019-01-29 PROCEDURE — 83036 HEMOGLOBIN GLYCOSYLATED A1C: CPT

## 2019-01-30 LAB
ESTIMATED AVERAGE GLUCOSE: 111.2 MG/DL
HBA1C MFR BLD: 5.5 %

## 2019-02-14 ENCOUNTER — ROUTINE PRENATAL (OUTPATIENT)
Dept: OBGYN | Age: 39
End: 2019-02-14
Payer: COMMERCIAL

## 2019-02-14 VITALS
SYSTOLIC BLOOD PRESSURE: 133 MMHG | DIASTOLIC BLOOD PRESSURE: 71 MMHG | HEART RATE: 106 BPM | WEIGHT: 191 LBS | BODY MASS INDEX: 38.58 KG/M2

## 2019-02-14 DIAGNOSIS — Z34.82 PRENATAL CARE, SUBSEQUENT PREGNANCY, SECOND TRIMESTER: Primary | ICD-10-CM

## 2019-02-14 DIAGNOSIS — Z34.92 PRENATAL CARE IN SECOND TRIMESTER: ICD-10-CM

## 2019-02-14 LAB
BILIRUBIN URINE: NEGATIVE MG/DL
BLOOD, URINE: NEGATIVE
CLARITY: CLEAR
COLOR: YELLOW
GLUCOSE URINE: NEGATIVE MG/DL
KETONES, URINE: NEGATIVE MG/DL
LEUKOCYTE ESTERASE, URINE: NEGATIVE
NITRITE, URINE: NEGATIVE
PH UA: 6
PROTEIN UA: NEGATIVE MG/DL
SPECIFIC GRAVITY UA: 1.02
UROBILINOGEN, URINE: 0.2 E.U./DL

## 2019-02-14 PROCEDURE — 99213 OFFICE O/P EST LOW 20 MIN: CPT | Performed by: OBSTETRICS & GYNECOLOGY

## 2019-02-14 PROCEDURE — 81003 URINALYSIS AUTO W/O SCOPE: CPT

## 2019-03-07 ENCOUNTER — HOSPITAL ENCOUNTER (OUTPATIENT)
Dept: ULTRASOUND IMAGING | Age: 39
Discharge: HOME | End: 2019-03-07
Payer: COMMERCIAL

## 2019-03-07 ENCOUNTER — HOSPITAL ENCOUNTER (OUTPATIENT)
Age: 39
Discharge: HOME | End: 2019-03-07
Payer: COMMERCIAL

## 2019-03-07 ENCOUNTER — ROUTINE PRENATAL (OUTPATIENT)
Dept: OBGYN | Age: 39
End: 2019-03-07
Payer: COMMERCIAL

## 2019-03-07 VITALS
WEIGHT: 192 LBS | HEART RATE: 102 BPM | SYSTOLIC BLOOD PRESSURE: 132 MMHG | DIASTOLIC BLOOD PRESSURE: 70 MMHG | BODY MASS INDEX: 38.78 KG/M2

## 2019-03-07 DIAGNOSIS — O09.299 PREVIOUS GESTATIONAL DIABETES MELLITUS, ANTEPARTUM: ICD-10-CM

## 2019-03-07 DIAGNOSIS — Z34.92 PRENATAL CARE IN SECOND TRIMESTER: ICD-10-CM

## 2019-03-07 DIAGNOSIS — Z86.32 PREVIOUS GESTATIONAL DIABETES MELLITUS, ANTEPARTUM: ICD-10-CM

## 2019-03-07 DIAGNOSIS — Z34.82 PRENATAL CARE, SUBSEQUENT PREGNANCY, SECOND TRIMESTER: ICD-10-CM

## 2019-03-07 DIAGNOSIS — Z34.82 PRENATAL CARE, SUBSEQUENT PREGNANCY, SECOND TRIMESTER: Primary | ICD-10-CM

## 2019-03-07 LAB
BILIRUBIN URINE: NEGATIVE MG/DL
BLOOD, URINE: NEGATIVE
CLARITY: CLEAR
COLOR: YELLOW
GLUCOSE CHALLENGE: 190 MG/DL
GLUCOSE URINE: NEGATIVE MG/DL
HCT VFR BLD CALC: 34.2 % (ref 36–48)
HEMOGLOBIN: 11.3 G/DL (ref 12–16)
KETONES, URINE: NEGATIVE MG/DL
LEUKOCYTE ESTERASE, URINE: NEGATIVE
MCH RBC QN AUTO: 29 PG (ref 26–34)
MCHC RBC AUTO-ENTMCNC: 33 G/DL (ref 31–36)
MCV RBC AUTO: 88 FL (ref 80–100)
NITRITE, URINE: NEGATIVE
PDW BLD-RTO: 13.7 % (ref 12.4–15.4)
PH UA: 6 (ref 5–8)
PLATELET # BLD: 228 K/UL (ref 135–450)
PMV BLD AUTO: 10.2 FL (ref 5–10.5)
PROTEIN UA: NEGATIVE MG/DL
RBC # BLD: 3.89 M/UL (ref 4–5.2)
SPECIFIC GRAVITY UA: <=1.005 (ref 1–1.03)
UROBILINOGEN, URINE: 0.2 E.U./DL
WBC # BLD: 10.8 K/UL (ref 4–11)

## 2019-03-07 PROCEDURE — 82950 GLUCOSE TEST: CPT

## 2019-03-07 PROCEDURE — 85027 COMPLETE CBC AUTOMATED: CPT

## 2019-03-07 PROCEDURE — 99212 OFFICE O/P EST SF 10 MIN: CPT | Performed by: OBSTETRICS & GYNECOLOGY

## 2019-03-07 PROCEDURE — 76805 OB US >/= 14 WKS SNGL FETUS: CPT

## 2019-03-07 PROCEDURE — 36415 COLL VENOUS BLD VENIPUNCTURE: CPT

## 2019-03-07 PROCEDURE — 81003 URINALYSIS AUTO W/O SCOPE: CPT

## 2019-03-08 ENCOUNTER — TELEPHONE (OUTPATIENT)
Dept: OBGYN | Age: 39
End: 2019-03-08

## 2019-04-04 ENCOUNTER — ROUTINE PRENATAL (OUTPATIENT)
Dept: OBGYN | Age: 39
End: 2019-04-04
Payer: COMMERCIAL

## 2019-04-04 VITALS
DIASTOLIC BLOOD PRESSURE: 71 MMHG | HEART RATE: 89 BPM | BODY MASS INDEX: 40.72 KG/M2 | WEIGHT: 201.6 LBS | SYSTOLIC BLOOD PRESSURE: 136 MMHG

## 2019-04-04 DIAGNOSIS — O24.415 GESTATIONAL DIABETES MELLITUS (GDM) IN SECOND TRIMESTER CONTROLLED ON ORAL HYPOGLYCEMIC DRUG: ICD-10-CM

## 2019-04-04 DIAGNOSIS — Z34.92 PRENATAL CARE IN SECOND TRIMESTER: Primary | ICD-10-CM

## 2019-04-04 LAB
BILIRUBIN URINE: NEGATIVE MG/DL
BLOOD, URINE: ABNORMAL
CLARITY: CLEAR
COLOR: YELLOW
GLUCOSE URINE: NEGATIVE MG/DL
KETONES, URINE: NEGATIVE MG/DL
LEUKOCYTE ESTERASE, URINE: NEGATIVE
NITRITE, URINE: NEGATIVE
PH UA: 6.5 (ref 5–8)
PROTEIN UA: NEGATIVE MG/DL
SPECIFIC GRAVITY UA: 1.02 (ref 1–1.03)
UROBILINOGEN, URINE: 0.2 E.U./DL

## 2019-04-04 PROCEDURE — 99214 OFFICE O/P EST MOD 30 MIN: CPT | Performed by: OBSTETRICS & GYNECOLOGY

## 2019-04-04 PROCEDURE — 81003 URINALYSIS AUTO W/O SCOPE: CPT

## 2019-04-04 RX ORDER — GLYBURIDE 2.5 MG/1
2.5 TABLET ORAL 2 TIMES DAILY WITH MEALS
Qty: 30 TABLET | Refills: 3 | Status: SHIPPED | OUTPATIENT
Start: 2019-04-04

## 2019-04-04 NOTE — PROGRESS NOTES
No c/o, VS reviewed  Pt is checking BS for 4 weeks- -17, 1h PP: 130-169's. Pt states she is watching her diet. Will start on Glyburide and follow up in 2 weeks.

## 2019-04-08 ENCOUNTER — HOSPITAL ENCOUNTER (OUTPATIENT)
Age: 39
Discharge: HOME | End: 2019-04-09
Attending: OBSTETRICS & GYNECOLOGY | Admitting: OBSTETRICS & GYNECOLOGY
Payer: COMMERCIAL

## 2019-04-08 VITALS — SYSTOLIC BLOOD PRESSURE: 120 MMHG | DIASTOLIC BLOOD PRESSURE: 70 MMHG | RESPIRATION RATE: 18 BRPM | HEART RATE: 82 BPM

## 2019-04-08 PROCEDURE — 81003 URINALYSIS AUTO W/O SCOPE: CPT

## 2019-04-09 LAB
BILIRUBIN URINE: NEGATIVE
BLOOD, URINE: NEGATIVE
CLARITY: CLEAR
COLOR: YELLOW
GLUCOSE URINE: NEGATIVE MG/DL
KETONES, URINE: NEGATIVE MG/DL
LEUKOCYTE ESTERASE, URINE: NEGATIVE
MICROSCOPIC EXAMINATION: NORMAL
NITRITE, URINE: NEGATIVE
PH UA: 7 (ref 5–8)
PROTEIN UA: NEGATIVE MG/DL
SPECIFIC GRAVITY UA: 1.01 (ref 1–1.03)
URINE TYPE: NORMAL
UROBILINOGEN, URINE: 0.2 E.U./DL

## 2019-04-09 PROCEDURE — 99211 OFF/OP EST MAY X REQ PHY/QHP: CPT

## 2019-04-09 NOTE — PROGRESS NOTES
not cover ODT. 12/11/18   KAROLYN Dawson        PRENATAL CARE:    Complicated by: DOMINIQUE Velasquez    REVIEW OF SYSTEMS:     Pertinent items are noted in HPI. PHYSICAL EXAM:    Vital Signs: Blood pressure 120/70, pulse 82, resp. rate 18, last menstrual period 10/19/2018. Abdomen soft, non tender, consistent with her EGA. Fetal heart rate:  Baseline Heart Rate 150  Contraction frequency:  Quiet     Membranes:  Intact    General Labs:      U/A:  pending      ASSESSMENT:    24w4d with symphysis pubis diastasis and SI joint pain. No evidence of PTL     PLAN:  Disposition:  Home once UA resulted  Instructions: recommend maternity belt for comfort, reassurance given. Keep next apt.     Dinesh Zacarias  4/9/2019

## 2019-04-09 NOTE — FLOWSHEET NOTE
Discharged home ambulatory with S.O. Present and supportive. Written discharge instructions given and reviewed, verbalized understanding.

## 2019-04-18 ENCOUNTER — ROUTINE PRENATAL (OUTPATIENT)
Dept: OBGYN | Age: 39
End: 2019-04-18
Payer: COMMERCIAL

## 2019-04-18 VITALS
WEIGHT: 204 LBS | BODY MASS INDEX: 41.2 KG/M2 | HEART RATE: 105 BPM | DIASTOLIC BLOOD PRESSURE: 71 MMHG | SYSTOLIC BLOOD PRESSURE: 109 MMHG

## 2019-04-18 DIAGNOSIS — Z34.92 PRENATAL CARE IN SECOND TRIMESTER: Primary | ICD-10-CM

## 2019-04-18 DIAGNOSIS — O99.210 OBESITY IN PREGNANCY, ANTEPARTUM: ICD-10-CM

## 2019-04-18 LAB
BILIRUBIN URINE: NEGATIVE MG/DL
BLOOD, URINE: NEGATIVE
CLARITY: CLEAR
COLOR: YELLOW
GLUCOSE URINE: NEGATIVE MG/DL
KETONES, URINE: NEGATIVE MG/DL
LEUKOCYTE ESTERASE, URINE: NEGATIVE
NITRITE, URINE: NEGATIVE
PH UA: 6.5 (ref 5–8)
PROTEIN UA: NEGATIVE MG/DL
SPECIFIC GRAVITY UA: 1.02 (ref 1–1.03)
UROBILINOGEN, URINE: 0.2 E.U./DL

## 2019-04-18 PROCEDURE — 99212 OFFICE O/P EST SF 10 MIN: CPT | Performed by: OBSTETRICS & GYNECOLOGY

## 2019-04-18 PROCEDURE — 81003 URINALYSIS AUTO W/O SCOPE: CPT

## 2019-04-18 NOTE — PROGRESS NOTES
All BS are elevated, has very poor understanding of diet/exercise, eats two times a day, predominantly carbs only with no protein, 1st meal of day at 1-2 pm (wakes up at 6 am). Checking only 3 BS a day. Discussed 6 meals a day, add protein ,reduce carbs per protocol, add exercise.  Will change glyburide to 2.5 in am and 5 at night    RTO in a week, likely needs insulin

## 2019-04-18 NOTE — PROGRESS NOTES
Brought BS log. Reports + fetal movement, denies bleeding, leaking of fluid or ctx. C/O RLQ pelvic pain past 4 days, ,mostly when lying down.

## 2019-04-25 ENCOUNTER — ROUTINE PRENATAL (OUTPATIENT)
Dept: OBGYN | Age: 39
End: 2019-04-25
Payer: COMMERCIAL

## 2019-04-25 VITALS
HEART RATE: 96 BPM | SYSTOLIC BLOOD PRESSURE: 116 MMHG | DIASTOLIC BLOOD PRESSURE: 70 MMHG | WEIGHT: 206 LBS | BODY MASS INDEX: 41.61 KG/M2

## 2019-04-25 DIAGNOSIS — Z34.83 PRENATAL CARE, SUBSEQUENT PREGNANCY, THIRD TRIMESTER: Primary | ICD-10-CM

## 2019-04-25 LAB
BILIRUBIN URINE: NEGATIVE MG/DL
BLOOD, URINE: NEGATIVE
CLARITY: CLEAR
COLOR: YELLOW
GLUCOSE URINE: NEGATIVE MG/DL
KETONES, URINE: NEGATIVE MG/DL
LEUKOCYTE ESTERASE, URINE: NEGATIVE
NITRITE, URINE: NEGATIVE
PH UA: 6.5 (ref 5–8)
PROTEIN UA: NEGATIVE MG/DL
SPECIFIC GRAVITY UA: 1.01 (ref 1–1.03)
UROBILINOGEN, URINE: 0.2 E.U./DL

## 2019-04-25 PROCEDURE — 99214 OFFICE O/P EST MOD 30 MIN: CPT | Performed by: OBSTETRICS & GYNECOLOGY

## 2019-04-25 PROCEDURE — 81003 URINALYSIS AUTO W/O SCOPE: CPT

## 2019-05-01 ENCOUNTER — ROUTINE PRENATAL (OUTPATIENT)
Dept: PERINATAL CARE | Age: 39
End: 2019-05-01
Payer: COMMERCIAL

## 2019-05-01 ENCOUNTER — HOSPITAL ENCOUNTER (OUTPATIENT)
Age: 39
Discharge: HOME | End: 2019-05-01
Attending: OBSTETRICS & GYNECOLOGY | Admitting: OBSTETRICS & GYNECOLOGY
Payer: COMMERCIAL

## 2019-05-01 VITALS
HEART RATE: 83 BPM | DIASTOLIC BLOOD PRESSURE: 55 MMHG | RESPIRATION RATE: 18 BRPM | TEMPERATURE: 96.4 F | SYSTOLIC BLOOD PRESSURE: 117 MMHG

## 2019-05-01 VITALS
WEIGHT: 209.4 LBS | DIASTOLIC BLOOD PRESSURE: 81 MMHG | SYSTOLIC BLOOD PRESSURE: 121 MMHG | HEART RATE: 103 BPM | BODY MASS INDEX: 42.29 KG/M2

## 2019-05-01 DIAGNOSIS — Z34.82 PRENATAL CARE, SUBSEQUENT PREGNANCY, SECOND TRIMESTER: Primary | ICD-10-CM

## 2019-05-01 LAB
BILIRUBIN URINE: NEGATIVE
BLOOD, URINE: NEGATIVE
CLARITY: CLEAR
COLOR: YELLOW
GLUCOSE URINE: NEGATIVE MG/DL
KETONES, URINE: NEGATIVE MG/DL
LEUKOCYTE ESTERASE, URINE: NEGATIVE
MICROSCOPIC EXAMINATION: NORMAL
NITRITE, URINE: NEGATIVE
PH UA: 7 (ref 5–8)
PROTEIN UA: NEGATIVE MG/DL
SPECIFIC GRAVITY UA: <1.005 (ref 1–1.03)
URINE TYPE: NORMAL
UROBILINOGEN, URINE: 0.2 E.U./DL

## 2019-05-01 PROCEDURE — 81003 URINALYSIS AUTO W/O SCOPE: CPT

## 2019-05-01 PROCEDURE — 99211 OFF/OP EST MAY X REQ PHY/QHP: CPT

## 2019-05-01 PROCEDURE — 76811 OB US DETAILED SNGL FETUS: CPT | Performed by: OBSTETRICS & GYNECOLOGY

## 2019-05-01 ASSESSMENT — PAIN DESCRIPTION - DESCRIPTORS: DESCRIPTORS: SHARP;SHOOTING

## 2019-05-01 NOTE — PROGRESS NOTES
MFM consult note. A 41year old A2 lady at 32 weeks referred by Dr Maciej Sosa for management of DM. She was diagnosed as early as 12 weeks. She had hx of GDM in previous pregnancy. SHE Reports no complaints except leg pain and swelling( pt send to triage for assessment). PAST MEDICAL HISTORY:   Obesity, AMA     PAST SURGICAL HISTORY:  is significant for: 2X PREVIOUS Csection     PAST OBSTETRICAL HISTORY:   is significant for: csection and GDM. Medication:   PNV and gybride. SUBJECTIVE: she denies any complaints, and is not having nausea or vomiting or any vaginal bleeding. ASSESSMENT:     1.   IUP @27 5/7  weeks EGA. 2.  pregestational DM since diagnosed at 12 weeks (last HBA1C is 5.5)  3. Previous Csection 2x ( placenta anterior next ultrasound we will check placentation in order to make sure no abnl placenta as accreta). For repeat Csection   4.rubella non immune , post partum vaccination. 5.AMA( based on notes genetic testing offered I did not see results>   6.obesity   7.history of gestational DM. DISCUSSION of pre gestational DM. Diabetes in pregnancy:     Explained to the patient that as the fetus grows and placental growth hormone is secreted, the insulin resistance increases as the gestation advances. This is usually noted with elevated fasting blood sugars that really can not be treated by diet alone. The glycemic goals in pregnancy were reviewed; fasting and premeals blood sugars 60 to 90 mg/dL, 1 hour postprandial < 130 mg/dL. Explained to the patient why it is so difficult to control fasting blood sugars in pregnancy because of the cortisol directed gluconeogensis from the liver in the early morning hours. I advised the patient that her blood sugars should be reviewed at least weekly, usually by fax or email. Discussed with the patient the increased risk of fetal congenital anomalies associated with pregestational diabetes.  This risk is directly related to the degree of glycemic control in the 3 months prior to conception. The risks of spina bifida and congenital heart were discussed. In patients with an initial pregnancy hemoglobin A1c below 6, the risk of congenital anomalies is similar to the risk in nondiabetic patients. Levels between 6 and 7.9 are associated with a risk of malformations approximately 5%. For HbA1c levels between 8 and 9.9, the risk is approximately 13%. For values of 10 and higher, the risk is approximately 35%. Discussed with the patient the risks associated with pregestational diabetes in pregnancy, including the increased risks of: Preeclampsia, Macrosomia/shoulder dystocia and increased need to C/section delivery, growth restriction, complications of  section such as wound infection, stillbirth. Discussed with the patient the need for tight glucose control in order to reduce the risks of complications due to diabetes. Patient need to be referred to our website [facility], and our diabetes section where all forms and education are found. Recommendations:   MATERNAL   1. Baseline ECG if not done in past year. 2. Baseline assessment of preeclampsia labs and renal function with 24-hour urine for protein and creatinine clearance. 3. Check TSH if not done in past year. 4. Check hemoglobin A1c and fructosamine every trimester. 5. Patient should have an ophthalmology visit if this was not done in the last year. 6. Dietician consultation for pregnancy needs. 7. Titration of medication  to maintain fasting blood sugars below 90 and 1-hour postprandial blood sugars below 130. incresed her gylbride to 5 mg po at am and 7.5 mg po at pm.   8. Patient instructed to fax or call blood sugars weekly   9. Case management with the Diabetes Nurse. 10. Patient was advised that [facility] can manage her blood sugars during pregnancy and she will continue care other peralta at [facility] clinic at [facility]. FETAL   1.  Serial ultrasounds to assess fetal growth at , 28, 32 and 36 weeks. 3. Twice-weekly NSTs and weekly AFIs beginning at 32 weeks gestation. 4. Fetal echo at 27-28 weeks gestation. 5. Delivery completed by Full term unless indicated humza narayan  6. Delivery mode: repeat Csection   PLAN for general antepartum care:    The patient should receive a TDaP vaccine between 27 and 36 weeks gestation, as recommended by [address] [place] [address] Number 566, June 2013 and the [facility].  The patient should also receive a flu vaccine for theflu season as recommended by ACOG and the CDC. F/u next week. At the completion of the consult all the patient's questions and concerns were answered and addressed.     TIME SPENT IN CONSULTATION: 60  minutes in face-to-face consultation and counseling

## 2019-05-01 NOTE — H&P
Department of Obstetrics and Gynecology  Labor and Delivery Triage Note        CHIEF COMPLAINT:  Low back pain     HISTORY OF PRESENT ILLNESS:      The patient is a 41 y.o. female 33w11d. OB History        4    Para   2    Term   2            AB   1    Living   2       SAB   1    TAB        Ectopic        Molar        Multiple        Live Births   2              Patient presents with a chief complaint as above. Her pain is located at right sided low back pain, radiating down to her right leg, worse with movement. and is described as sharp and shooting.  The pain is intermittent and started several days ago    Estimated Due Date:  Estimated Date of Delivery: 19    PRENATAL CARE:    Complicated by: gestational diabetes class A 2    REVIEW OF SYSTEMS:    CONSTITUTIONAL:  negative  EYES:  negative  HEENT:  negative  RESPIRATORY:  negative  CARDIOVASCULAR:  negative  GASTROINTESTINAL:  negative  GENITOURINARY:  negative  INTEGUMENT/BREAST:  negative  MUSCULOSKELETAL:  negative  BEHAVIOR/PSYCH:  negative    PHYSICAL EXAM:    Vitals:    19 1449   BP: (!) 117/55   Resp: 18   Temp: 96.4 °F (35.8 °C)   TempSrc: Temporal       Physical Examination: General appearance - alert, well appearing, and in no distress  Chest - clear to auscultation, no wheezes, rales or rhonchi, symmetric air entry  Heart - normal rate, regular rhythm, normal S1, S2, no murmurs, rubs, clicks or gallops  Abdomen - soft, nontender, nondistended, no masses or organomegaly  Musculoskeletal - no joint tenderness, deformity or swelling  Extremities - peripheral pulses normal, no pedal edema, no clubbing or cyanosis                         Contraction frequency:  0 minutes    Membranes:  Intact    Lab Results   Component Value Date    WBC 10.8 2019    HGB 11.3 (L) 2019    HCT 34.2 (L) 2019     2019    ALT 22 2018    AST 23 2018     (L) 2018    K 3.6 2018    CL 99

## 2019-05-01 NOTE — FLOWSHEET NOTE
Pt discharged to home after Dr. Bianca Carias in to evaluate pt. And her right buttock and hip/back pain. Dr. Bianca Carias discussed sciatica pain and pt given discharge instructions with labor precautions and sciatica care /signs and symptioms. VSS. Urinalysis WNL. States active fetal movement. No complaints of contractions. Abdomen soft to palpate. Pt. Will keep regularly scheduled clinic visit tomorrow.

## 2019-05-01 NOTE — PROGRESS NOTES
Consult for GDM. On glyburide 2.5mg qa. m., 5mg qp.m. Pt. Brought BS log. States that when she checks her blood sugars after meals, the time ranges between 1-2 hours. Reports + fetal movement, denies bleeding, leaking of fluid or ctx. C/O low back pain, right buttock pain since yesterday. Pt. Was sent to triage to eval back/right buttock pain. Pt. Pharmacy was called, [phone] [facility]  Glyburide 5.0mg breakfast,7.5mg Dinner. 1 month supply with 1 RF. Pt. Is aware that she needs to  med from pharmacy.

## 2019-05-02 ENCOUNTER — ROUTINE PRENATAL (OUTPATIENT)
Dept: OBGYN | Age: 39
End: 2019-05-02
Payer: COMMERCIAL

## 2019-05-02 VITALS
BODY MASS INDEX: 42.21 KG/M2 | SYSTOLIC BLOOD PRESSURE: 106 MMHG | DIASTOLIC BLOOD PRESSURE: 62 MMHG | WEIGHT: 209 LBS | HEART RATE: 96 BPM

## 2019-05-02 DIAGNOSIS — Z34.92 PRENATAL CARE IN SECOND TRIMESTER: Primary | ICD-10-CM

## 2019-05-02 DIAGNOSIS — O24.419 GESTATIONAL DIABETES MELLITUS (GDM) AFFECTING PREGNANCY: ICD-10-CM

## 2019-05-02 LAB
BILIRUBIN URINE: NEGATIVE MG/DL
BLOOD, URINE: NEGATIVE
CLARITY: CLEAR
COLOR: YELLOW
GLUCOSE URINE: NEGATIVE MG/DL
KETONES, URINE: NEGATIVE MG/DL
LEUKOCYTE ESTERASE, URINE: NEGATIVE
NITRITE, URINE: NEGATIVE
PH UA: 6 (ref 5–8)
PROTEIN UA: NEGATIVE MG/DL
SPECIFIC GRAVITY UA: 1.01 (ref 1–1.03)
UROBILINOGEN, URINE: 0.2 E.U./DL

## 2019-05-02 PROCEDURE — 99212 OFFICE O/P EST SF 10 MIN: CPT | Performed by: OBSTETRICS & GYNECOLOGY

## 2019-05-02 PROCEDURE — 81003 URINALYSIS AUTO W/O SCOPE: CPT

## 2019-05-02 NOTE — PROGRESS NOTES
Pt. Was scheduled for diabetic education with Arabic  5/16/19 at 3p.m., F/U appt. 6-6-19 1p.m. Pt. Was given orders for labs, and 24 hr urine for protein and creatinine clearance. Pt. Was instructed how to collect 24 hr urine. Pt. States she will bring in on 5-4-19 and have labs and EKG done that day.

## 2019-05-02 NOTE — PROGRESS NOTES
MFM recommendations reviewed, plan fetal echo. Patient remains very non compliant with GDM diet, does not exercise. Appears to be very reluctant. Encouraged. Discussed risks with her. Plan made for maternal and fetal testing, DM Mx by DM Nurse/MFM, OB care per clinic. Genetic s/t reviewed, patient declines  Ms-Sk d/w her, use support.

## 2019-05-02 NOTE — PROGRESS NOTES
C/o constipation. C/o low back/right buttock pain, rates #8 at present,was eval in triage yesterday for this pain. Was seen by MFM yesterday for diabetic mgmt. Increased glyburide to 7.5 dinner, 5.0 breakfast. States fasting this morning was 144, 1 hr pp breakfast this morning 188,ate 1 cup of corn. Report + fetal movement, denies bleeding, leaking of fluid or ctx.

## 2019-05-04 ENCOUNTER — HOSPITAL ENCOUNTER (OUTPATIENT)
Age: 39
Discharge: HOME | End: 2019-05-04
Payer: COMMERCIAL

## 2019-05-04 DIAGNOSIS — O24.419 GESTATIONAL DIABETES MELLITUS (GDM) AFFECTING PREGNANCY: ICD-10-CM

## 2019-05-04 LAB
24HR URINE VOLUME (ML): 2550 ML
A/G RATIO: 0.9 (ref 1.1–2.2)
ALBUMIN SERPL-MCNC: 3.2 G/DL (ref 3.4–5)
ALP BLD-CCNC: 77 U/L (ref 40–129)
ALT SERPL-CCNC: 11 U/L (ref 10–40)
ANION GAP SERPL CALCULATED.3IONS-SCNC: 14 MMOL/L (ref 3–16)
AST SERPL-CCNC: 12 U/L (ref 15–37)
BILIRUB SERPL-MCNC: <0.2 MG/DL (ref 0–1)
BUN BLDV-MCNC: 8 MG/DL (ref 7–20)
CALCIUM SERPL-MCNC: 8.9 MG/DL (ref 8.3–10.6)
CHLORIDE BLD-SCNC: 103 MMOL/L (ref 99–110)
CO2: 22 MMOL/L (ref 21–32)
CREAT SERPL-MCNC: <0.5 MG/DL (ref 0.6–1.1)
CREATININE 24 HOUR URINE: 1.8 G/24HR (ref 0.6–1.5)
GFR AFRICAN AMERICAN: >60
GFR NON-AFRICAN AMERICAN: >60
GLOBULIN: 3.7 G/DL
GLUCOSE BLD-MCNC: 108 MG/DL (ref 70–99)
HCT VFR BLD CALC: 29.2 % (ref 36–48)
HEMOGLOBIN: 9.5 G/DL (ref 12–16)
MCH RBC QN AUTO: 27.3 PG (ref 26–34)
MCHC RBC AUTO-ENTMCNC: 32.4 G/DL (ref 31–36)
MCV RBC AUTO: 84.1 FL (ref 80–100)
PDW BLD-RTO: 14.4 % (ref 12.4–15.4)
PLATELET # BLD: 215 K/UL (ref 135–450)
PMV BLD AUTO: 9.7 FL (ref 5–10.5)
POTASSIUM SERPL-SCNC: 4.3 MMOL/L (ref 3.5–5.1)
PROTEIN 24 HOUR URINE: 0.28 G/24HR
RBC # BLD: 3.47 M/UL (ref 4–5.2)
SODIUM BLD-SCNC: 139 MMOL/L (ref 136–145)
T4 FREE: 0.8 NG/DL (ref 0.9–1.8)
TOTAL PROTEIN: 6.9 G/DL (ref 6.4–8.2)
TSH REFLEX: 7.24 UIU/ML (ref 0.27–4.2)
URIC ACID, SERUM: 2.7 MG/DL (ref 2.6–6)
WBC # BLD: 11.8 K/UL (ref 4–11)

## 2019-05-04 PROCEDURE — 84439 ASSAY OF FREE THYROXINE: CPT

## 2019-05-04 PROCEDURE — 82985 ASSAY OF GLYCATED PROTEIN: CPT

## 2019-05-04 PROCEDURE — 83036 HEMOGLOBIN GLYCOSYLATED A1C: CPT

## 2019-05-04 PROCEDURE — 36415 COLL VENOUS BLD VENIPUNCTURE: CPT

## 2019-05-04 PROCEDURE — 84156 ASSAY OF PROTEIN URINE: CPT

## 2019-05-04 PROCEDURE — 85027 COMPLETE CBC AUTOMATED: CPT

## 2019-05-04 PROCEDURE — 93005 ELECTROCARDIOGRAM TRACING: CPT

## 2019-05-04 PROCEDURE — 84443 ASSAY THYROID STIM HORMONE: CPT

## 2019-05-04 PROCEDURE — 84550 ASSAY OF BLOOD/URIC ACID: CPT

## 2019-05-04 PROCEDURE — 80053 COMPREHEN METABOLIC PANEL: CPT

## 2019-05-05 LAB
EKG ATRIAL RATE: 92 BPM
EKG DIAGNOSIS: NORMAL
EKG P AXIS: 38 DEGREES
EKG P-R INTERVAL: 188 MS
EKG Q-T INTERVAL: 366 MS
EKG QRS DURATION: 86 MS
EKG QTC CALCULATION (BAZETT): 452 MS
EKG R AXIS: 26 DEGREES
EKG T AXIS: 37 DEGREES
EKG VENTRICULAR RATE: 92 BPM
ESTIMATED AVERAGE GLUCOSE: 137 MG/DL
HBA1C MFR BLD: 6.4 %

## 2019-05-05 PROCEDURE — 93010 ELECTROCARDIOGRAM REPORT: CPT | Performed by: INTERNAL MEDICINE

## 2019-05-06 LAB — FRUCTOSAMINE: 239 UMOL/L (ref 170–285)

## 2019-05-08 ENCOUNTER — ROUTINE PRENATAL (OUTPATIENT)
Dept: PERINATAL CARE | Age: 39
End: 2019-05-08
Payer: COMMERCIAL

## 2019-05-08 VITALS
WEIGHT: 211.8 LBS | DIASTOLIC BLOOD PRESSURE: 69 MMHG | BODY MASS INDEX: 42.78 KG/M2 | HEART RATE: 105 BPM | SYSTOLIC BLOOD PRESSURE: 111 MMHG

## 2019-05-08 DIAGNOSIS — O99.210 OBESITY IN PREGNANCY, ANTEPARTUM: ICD-10-CM

## 2019-05-08 DIAGNOSIS — O24.414 INSULIN CONTROLLED GESTATIONAL DIABETES MELLITUS (GDM) IN THIRD TRIMESTER: Primary | ICD-10-CM

## 2019-05-08 DIAGNOSIS — Z98.891 HISTORY OF C-SECTION: ICD-10-CM

## 2019-05-08 DIAGNOSIS — Z28.39 RUBELLA NON-IMMUNE STATUS, ANTEPARTUM: ICD-10-CM

## 2019-05-08 DIAGNOSIS — Z34.82 PRENATAL CARE, SUBSEQUENT PREGNANCY, SECOND TRIMESTER: ICD-10-CM

## 2019-05-08 DIAGNOSIS — O09.523 ELDERLY MULTIGRAVIDA IN THIRD TRIMESTER: ICD-10-CM

## 2019-05-08 DIAGNOSIS — O40.3XX0 POLYHYDRAMNIOS IN THIRD TRIMESTER COMPLICATION, SINGLE OR UNSPECIFIED FETUS: ICD-10-CM

## 2019-05-08 DIAGNOSIS — O09.899 RUBELLA NON-IMMUNE STATUS, ANTEPARTUM: ICD-10-CM

## 2019-05-08 PROCEDURE — 93325 DOPPLER ECHO COLOR FLOW MAPG: CPT

## 2019-05-08 PROCEDURE — 76827 ECHO EXAM OF FETAL HEART: CPT

## 2019-05-08 PROCEDURE — 76815 OB US LIMITED FETUS(S): CPT

## 2019-05-08 PROCEDURE — 76825 ECHO EXAM OF FETAL HEART: CPT

## 2019-05-08 NOTE — PROGRESS NOTES
At the end of todays visit, I spoke to Dr. Betzy Huerta, she states pt. Needs to transfer care to high risk OB clinic. Records faxed to [facility],waiting for appt. Pt. Was advised on insulin dosage and administration. NPH 28U QAM and 11 units Q dinner, Novolog pen 14 units QAM and 11 units Q  dinner called to pt. Pharm- 1 month supply and 2 RF. #120 teststrips and lancets to test 4 times per day, 1 mo supply + 2RF. Pt. States she has given herself insulin in the past. Pt. States she ran out of test strips last night. Spoke to [address] [place] [address], was informed test strips need prior auth, prior auth form faxed to [employer]. Pt. Advised continue Glyburide until strips are available. Pt. Was advised thyroid level low, needs to start thyroid med. Levothyroxine 100mcg called to [facility], #30 + 2 RF. Pt. States she understands how to give herself insulin, reviewed vial and pen administration and pt. Was given written instructions regarding diabetic diet with carb counting, insulin administration and diabetic information. [facility]  present during entire visit.

## 2019-05-08 NOTE — PROGRESS NOTES
Fetal echo and diabetic F/U. Brought BS log, taking glyburide 5mg breakfast , 7.5 mg dinner. States she ran out of test strips last night. Reports + fetal movement, denies bleeding, leaking of fluid or ctx.

## 2019-05-08 NOTE — PROGRESS NOTES
MATERNAL-FETAL MEDICINE     Follow-up Diabetes evaluation    2019    Dear Dr. Felton Guillaume,    Thank you for referring Brodie Wiliknson to the [address] [place] [address] at [facility] for follow-up diabetes in pregnancy evaluation. As you know, Brodie Wilkinson is a 41 y.o. [id number] at 28w5d with a sam gestation and good obstetric dating. She is being co-managed due to her history of early-onset GDMA2 along with the following:   Patient Active Problem List   Diagnosis    History of  x2    History of insulin controlled gestational diabetes mellitus    Elderly multigravida in first trimester    Rubella non-immune status, antepartum    Obesity in pregnancy, antepartum       Today, through the assistance of a hospital , she described some difficulty in adhering to the prescribed ADA diet. She has been compliant with 4 point FSBS monitoring. I reviewed her glycemic log with the findings listed below. She denies any episodes of hypoglycemia. She notes appropriate fetal movement and denies any obstetric complaints including ctx's, loss of fluid, vaginal bleeding, or change in discharge. MEDICATIONS:   Current Outpatient Medications   Medication Sig Dispense Refill    glyBURIDE (DIABETA) 2.5 MG tablet Take 1 tablet by mouth 2 times daily (with meals) (Patient taking differently: Take 2.5 mg by mouth 2 times daily (with meals) Indications: 5.0 mg with breakfast, 7.5 mg with dinner ) 30 tablet 3    Prenatal Vit-Fe Fumarate-FA (PRENATAL VITAMIN PLUS LOW IRON) 27-1 MG TABS Take 1 tablet by mouth daily 30 tablet 11    FREESTYLE LANCETS MISC 1 each by Does not apply route 4 times daily 100 each 5    blood glucose monitor strips Test 4 times a day & as needed for symptoms of irregular blood glucose.  100 strip 5    glucose monitoring kit (FREESTYLE) monitoring kit 1 kit by Does not apply route daily 1 kit 0    ondansetron (ZOFRAN ODT) 4 MG disintegrating tablet Take 1-2 tablets by mouth every 12 hours as needed for Nausea May Sub regular tablet (non-ODT) if insurance does not cover ODT. 12 tablet 0     No current facility-administered medications for this visit. ALLERGIES:   Allergies   Allergen Reactions    Other      States had itching after taking Thyroid med.  2016       MEDICAL HISTORY:   Past Medical History:   Diagnosis Date    Diabetes mellitus ([address]     gest diab       SURGICAL HISTORY:   Past Surgical History:   Procedure Laterality Date     SECTION         SOCIAL HISTORY:   Social History     Socioeconomic History    Marital status:      Spouse name: Not on file    Number of children: Not on file    Years of education: Not on file    Highest education level: Not on file   Occupational History    Not on file   Social Needs    Financial resource strain: Not on file    Food insecurity:     Worry: Not on file     Inability: Not on file    Transportation needs:     Medical: Not on file     Non-medical: Not on file   Tobacco Use    Smoking status: Never Smoker    Smokeless tobacco: Never Used   Substance and Sexual Activity    Alcohol use: No    Drug use: No    Sexual activity: Not on file   Lifestyle    Physical activity:     Days per week: Not on file     Minutes per session: Not on file    Stress: Not on file   Relationships    Social connections:     Talks on phone: Not on file     Gets together: Not on file     Attends Restoration service: Not on file     Active member of club or organization: Not on file     Attends meetings of clubs or organizations: Not on file     Relationship status: Not on file    Intimate partner violence:     Fear of current or ex partner: Not on file     Emotionally abused: Not on file     Physically abused: Not on file     Forced sexual activity: Not on file   Other Topics Concern    Not on file   Social History Narrative    Not on file       OBJECTIVE:   /69   Pulse 105   Wt 211 lb 12.8 oz (96.1 kg)   LMP 10/19/2018 (LMP Unknown)   BMI 42.78 kg/m²     GEN: NAD, A+O x 3  RESP: no distress  ABD: soft, obese, NT, gravid with approp FH. FHT visualized on US  EXT: warm and well perfused, no edema    ULTRASOUND FINDINGS: (today)    - Fetal echocardiogram performed - normal, no abnormalities   - Polyhydramnios with KIRSTEN of 26 cm   - Fetus in variable lie    GLYCEMIC LOG: ( - )  - Fastin - 176   (all abnormal)  - 1hr PP Bkfst:  139 - 249   (all abnormal)  - 1hr PP Lunch:  174 - 241   (all abnormal)  - 1hr PP Dinner: 156 - 231   (all abnormal)    Labs:  19  TSH: 7.24 H  FT4: 0.8  L  EKG: normal  24 hr protein: 281 mg  Hb.5 L  Hct: 29.2% L  Uric acid: 2.7  Creatinine: 0.5  AST/ALT: normal  Fructosamine: 239  HgbA1c: 6.4% H    IMPRESSION:   Rocío Esquivel is a 41 y.o. [id number] at 30w6d with a sam intrauterine gestation and good obstetric dating  1) GDMA2 - poorly controlled on glyburide therapy  2) Fetal polyhydramnios  3) Hx of C/section x 2  4) Hypothyroidism  5) Rubella non-immune  6) AMA    RECOMMENDATIONS:   · I reviewed with her the appropriate 6-feeding ADA diet with 4 point FSBS monitoring goals along with detailed instructions on how to count carbohydrates and how many per meal  · I initiated split dose insulin based on an ideal body weight + 30% correction   · AM: NPH 28 U; Novolog 14 U  · PM: NPH 11 U; Novolog 11 U  · Continue to fax or call in her FSBS values weekly to our office for the next 1-2 weeks  · I was alerted by Lavell Bobo and confirmed with Dr. Tonya Harrison that given that we are starting insulin, she will no longer be able to be cared-for in the [facility]. I reviewed with her options for transfer of care to [facility] or [facility]. She did not like either option stating that she does not drive far distances or on the highway, but eventually she agreed to transfer to [facility]. We have placed a call to help facilitate this transfer for you.    · She was instructed on the proper goals for pre-prandial values of ~100 gm/dl, fasting values of < 90 gm/dl, and 1 hr post-prandial values < 130 gm/dl  · Serial OB ultrasounds every 2 weeks to follow interval growth and amniotic fluid   · Serial Hgb A1c measurements every 8 weeks  · I started her on Levothyroxine 100 mcg daily due to the hypothyroidism  · Twice daily fetal kick counts beginning at 28 weeks through delivery  · Initiate twice weekly  testing at 32 weeks and continuing through delivery  · Plan for delivery ~39.0 weeks and perhaps sooner for evidence of suboptimal control (poor glycemic log, macrosomia, polyhydramnios, etc.. )    Thank you for allowing me to participate in the care of this patient. Please feel free to contact me for any further questions or concerns.      Myla Pierce    2019    Total face-to-face time spent: 45 min

## 2019-05-09 ENCOUNTER — TELEPHONE (OUTPATIENT)
Dept: PERINATAL CARE | Age: 39
End: 2019-05-09

## 2022-07-18 NOTE — PROGRESS NOTES
Pt with no c/o today; states baby is active. Patient denies vaginal bleeding and leaking of fluid/ctx. Glucose record with fasting range 108-160 and one hour range 119-178.
Dr. Watson

## 2023-07-26 NOTE — ADDENDUM NOTE
Addended by: Arnulfo Salgado on: 5/2/2019 10:29 AM     Modules accepted: [place]
Addended by: Tiffany Barbosa on: 5/2/2019 10:10 AM     Modules accepted: Orders
done